# Patient Record
Sex: FEMALE | Race: WHITE
[De-identification: names, ages, dates, MRNs, and addresses within clinical notes are randomized per-mention and may not be internally consistent; named-entity substitution may affect disease eponyms.]

---

## 2019-12-09 ENCOUNTER — HOSPITAL ENCOUNTER (EMERGENCY)
Dept: HOSPITAL 88 - ER | Age: 64
Discharge: HOME | End: 2019-12-09
Payer: MEDICARE

## 2019-12-09 VITALS — BODY MASS INDEX: 24.99 KG/M2 | HEIGHT: 65 IN | WEIGHT: 150 LBS

## 2019-12-09 DIAGNOSIS — Z85.3: ICD-10-CM

## 2019-12-09 DIAGNOSIS — R53.1: Primary | ICD-10-CM

## 2019-12-09 DIAGNOSIS — Z85.830: ICD-10-CM

## 2019-12-09 DIAGNOSIS — F32.9: ICD-10-CM

## 2019-12-09 DIAGNOSIS — F50.00: ICD-10-CM

## 2019-12-09 LAB
ALBUMIN SERPL-MCNC: 2.9 G/DL (ref 3.5–5)
ALBUMIN/GLOB SERPL: 0.8 {RATIO} (ref 0.8–2)
ALP SERPL-CCNC: 491 IU/L (ref 40–150)
ALT SERPL-CCNC: 10 IU/L (ref 0–55)
ANION GAP SERPL CALC-SCNC: 12.5 MMOL/L (ref 8–16)
BACTERIA URNS QL MICRO: (no result) /HPF
BASOPHILS # BLD AUTO: 0 10*3/UL (ref 0–0.1)
BASOPHILS NFR BLD AUTO: 0.7 % (ref 0–1)
BILIRUB UR QL: NEGATIVE
BUN SERPL-MCNC: 7 MG/DL (ref 7–26)
BUN/CREAT SERPL: 11 (ref 6–25)
CALCIUM SERPL-MCNC: 7.8 MG/DL (ref 8.4–10.2)
CHLORIDE SERPL-SCNC: 100 MMOL/L (ref 98–107)
CLARITY UR: (no result)
CO2 SERPL-SCNC: 26 MMOL/L (ref 22–29)
COLOR UR: YELLOW
DEPRECATED NEUTROPHILS # BLD AUTO: 2.9 10*3/UL (ref 2.1–6.9)
DEPRECATED RBC URNS MANUAL-ACNC: (no result) /HPF (ref 0–5)
EGFRCR SERPLBLD CKD-EPI 2021: > 60 ML/MIN (ref 60–?)
EOSINOPHIL # BLD AUTO: 0.1 10*3/UL (ref 0–0.4)
EOSINOPHIL NFR BLD AUTO: 1.8 % (ref 0–6)
EPI CELLS URNS QL MICRO: (no result) /LPF
ERYTHROCYTE [DISTWIDTH] IN CORD BLOOD: 13.9 % (ref 11.7–14.4)
GLOBULIN PLAS-MCNC: 3.6 G/DL (ref 2.3–3.5)
GLUCOSE SERPLBLD-MCNC: 128 MG/DL (ref 74–118)
HCT VFR BLD AUTO: 37.7 % (ref 34.2–44.1)
HGB BLD-MCNC: 12 G/DL (ref 12–16)
KETONES UR QL STRIP.AUTO: NEGATIVE
LEUKOCYTE ESTERASE UR QL STRIP.AUTO: NEGATIVE
LYMPHOCYTES # BLD: 1.2 10*3/UL (ref 1–3.2)
LYMPHOCYTES NFR BLD AUTO: 25.6 % (ref 18–39.1)
MCH RBC QN AUTO: 29.8 PG (ref 28–32)
MCHC RBC AUTO-ENTMCNC: 31.8 G/DL (ref 31–35)
MCV RBC AUTO: 93.5 FL (ref 81–99)
MONOCYTES # BLD AUTO: 0.3 10*3/UL (ref 0.2–0.8)
MONOCYTES NFR BLD AUTO: 6 % (ref 4.4–11.3)
NEUTS SEG NFR BLD AUTO: 65.5 % (ref 38.7–80)
NITRITE UR QL STRIP.AUTO: NEGATIVE
NON-SQ EPI CELLS URNS QL MICRO: (no result)
PLATELET # BLD AUTO: 393 X10E3/UL (ref 140–360)
POTASSIUM SERPL-SCNC: 3.5 MMOL/L (ref 3.5–5.1)
PROT UR QL STRIP.AUTO: NEGATIVE
RBC # BLD AUTO: 4.03 X10E6/UL (ref 3.6–5.1)
SODIUM SERPL-SCNC: 135 MMOL/L (ref 136–145)
SP GR UR STRIP: 1.01 (ref 1.01–1.02)
UROBILINOGEN UR STRIP-MCNC: 0.2 MG/DL (ref 0.2–1)
WBC #/AREA URNS HPF: (no result) /HPF (ref 0–5)

## 2019-12-09 PROCEDURE — 81001 URINALYSIS AUTO W/SCOPE: CPT

## 2019-12-09 PROCEDURE — 36415 COLL VENOUS BLD VENIPUNCTURE: CPT

## 2019-12-09 PROCEDURE — 80053 COMPREHEN METABOLIC PANEL: CPT

## 2019-12-09 PROCEDURE — 85025 COMPLETE CBC W/AUTO DIFF WBC: CPT

## 2019-12-09 PROCEDURE — 99284 EMERGENCY DEPT VISIT MOD MDM: CPT

## 2020-04-04 ENCOUNTER — HOSPITAL ENCOUNTER (INPATIENT)
Dept: HOSPITAL 88 - ER | Age: 65
LOS: 13 days | Discharge: HOME HEALTH | DRG: 330 | End: 2020-04-17
Attending: INTERNAL MEDICINE | Admitting: INTERNAL MEDICINE
Payer: MEDICARE

## 2020-04-04 VITALS — BODY MASS INDEX: 26.33 KG/M2 | WEIGHT: 158 LBS | HEIGHT: 65 IN

## 2020-04-04 DIAGNOSIS — I16.0: ICD-10-CM

## 2020-04-04 DIAGNOSIS — C50.919: ICD-10-CM

## 2020-04-04 DIAGNOSIS — Z82.49: ICD-10-CM

## 2020-04-04 DIAGNOSIS — E44.0: ICD-10-CM

## 2020-04-04 DIAGNOSIS — C79.51: ICD-10-CM

## 2020-04-04 DIAGNOSIS — F17.210: ICD-10-CM

## 2020-04-04 DIAGNOSIS — G62.9: ICD-10-CM

## 2020-04-04 DIAGNOSIS — Z91.041: ICD-10-CM

## 2020-04-04 DIAGNOSIS — K21.9: ICD-10-CM

## 2020-04-04 DIAGNOSIS — Z90.10: ICD-10-CM

## 2020-04-04 DIAGNOSIS — Z79.899: ICD-10-CM

## 2020-04-04 DIAGNOSIS — Z88.5: ICD-10-CM

## 2020-04-04 DIAGNOSIS — Z90.49: ICD-10-CM

## 2020-04-04 DIAGNOSIS — D70.1: ICD-10-CM

## 2020-04-04 DIAGNOSIS — I48.0: ICD-10-CM

## 2020-04-04 DIAGNOSIS — F32.9: ICD-10-CM

## 2020-04-04 DIAGNOSIS — I16.1: ICD-10-CM

## 2020-04-04 DIAGNOSIS — Z83.3: ICD-10-CM

## 2020-04-04 DIAGNOSIS — I10: ICD-10-CM

## 2020-04-04 DIAGNOSIS — K56.52: Primary | ICD-10-CM

## 2020-04-04 DIAGNOSIS — T45.1X5A: ICD-10-CM

## 2020-04-04 DIAGNOSIS — Z90.12: ICD-10-CM

## 2020-04-04 DIAGNOSIS — F41.9: ICD-10-CM

## 2020-04-04 DIAGNOSIS — C78.7: ICD-10-CM

## 2020-04-04 LAB
ALBUMIN SERPL-MCNC: 4.1 G/DL (ref 3.5–5)
ALBUMIN/GLOB SERPL: 1.2 {RATIO} (ref 0.8–2)
ALP SERPL-CCNC: 245 IU/L (ref 40–150)
ALT SERPL-CCNC: 10 IU/L (ref 0–55)
ANION GAP SERPL CALC-SCNC: 16.9 MMOL/L (ref 8–16)
BACTERIA URNS QL MICRO: (no result) /HPF
BASOPHILS # BLD AUTO: 0 10*3/UL (ref 0–0.1)
BASOPHILS NFR BLD AUTO: 0.4 % (ref 0–1)
BILIRUB UR QL: (no result)
BUN SERPL-MCNC: 10 MG/DL (ref 7–26)
BUN/CREAT SERPL: 14 (ref 6–25)
CALCIUM SERPL-MCNC: 9 MG/DL (ref 8.4–10.2)
CHLORIDE SERPL-SCNC: 97 MMOL/L (ref 98–107)
CK MB SERPL-MCNC: 1.8 NG/ML (ref 0–5)
CK SERPL-CCNC: 89 IU/L (ref 29–168)
CLARITY UR: CLEAR
CO2 SERPL-SCNC: 29 MMOL/L (ref 22–29)
COLOR UR: YELLOW
DEPRECATED APTT PLAS QN: 28.5 SECONDS (ref 23.8–35.5)
DEPRECATED INR PLAS: 1.01
DEPRECATED NEUTROPHILS # BLD AUTO: 2.2 10*3/UL (ref 2.1–6.9)
DEPRECATED RBC URNS MANUAL-ACNC: (no result) /HPF (ref 0–5)
EGFRCR SERPLBLD CKD-EPI 2021: > 60 ML/MIN (ref 60–?)
EOSINOPHIL # BLD AUTO: 0 10*3/UL (ref 0–0.4)
EOSINOPHIL NFR BLD AUTO: 0 % (ref 0–6)
EPI CELLS URNS QL MICRO: (no result) /LPF
ERYTHROCYTE [DISTWIDTH] IN CORD BLOOD: 15.9 % (ref 11.7–14.4)
GLOBULIN PLAS-MCNC: 3.5 G/DL (ref 2.3–3.5)
GLUCOSE SERPLBLD-MCNC: 133 MG/DL (ref 74–118)
HCT VFR BLD AUTO: 28.3 % (ref 34.2–44.1)
HGB BLD-MCNC: 9.3 G/DL (ref 12–16)
KETONES UR QL STRIP.AUTO: (no result)
LEUKOCYTE ESTERASE UR QL STRIP.AUTO: NEGATIVE
LIPASE SERPL-CCNC: < 4 U/L (ref 8–78)
LYMPHOCYTES # BLD: 0.3 10*3/UL (ref 1–3.2)
LYMPHOCYTES NFR BLD AUTO: 9.5 % (ref 18–39.1)
MCH RBC QN AUTO: 31.2 PG (ref 28–32)
MCHC RBC AUTO-ENTMCNC: 32.9 G/DL (ref 31–35)
MCV RBC AUTO: 95 FL (ref 81–99)
MONOCYTES # BLD AUTO: 0.2 10*3/UL (ref 0.2–0.8)
MONOCYTES NFR BLD AUTO: 8 % (ref 4.4–11.3)
NEUTS SEG NFR BLD AUTO: 82.1 % (ref 38.7–80)
NITRITE UR QL STRIP.AUTO: NEGATIVE
PLATELET # BLD AUTO: 222 X10E3/UL (ref 140–360)
POTASSIUM SERPL-SCNC: 3.9 MMOL/L (ref 3.5–5.1)
PROT UR QL STRIP.AUTO: >=300
PROTHROMBIN TIME: 13.9 SECONDS (ref 11.9–14.5)
RBC # BLD AUTO: 2.98 X10E6/UL (ref 3.6–5.1)
SODIUM SERPL-SCNC: 139 MMOL/L (ref 136–145)
SP GR UR STRIP: 1.02 (ref 1.01–1.02)
UROBILINOGEN UR STRIP-MCNC: 0.2 MG/DL (ref 0.2–1)
WBC #/AREA URNS HPF: (no result) /HPF (ref 0–5)

## 2020-04-04 PROCEDURE — 83690 ASSAY OF LIPASE: CPT

## 2020-04-04 PROCEDURE — 96365 THER/PROPH/DIAG IV INF INIT: CPT

## 2020-04-04 PROCEDURE — 88307 TISSUE EXAM BY PATHOLOGIST: CPT

## 2020-04-04 PROCEDURE — 71045 X-RAY EXAM CHEST 1 VIEW: CPT

## 2020-04-04 PROCEDURE — 83735 ASSAY OF MAGNESIUM: CPT

## 2020-04-04 PROCEDURE — 74176 CT ABD & PELVIS W/O CONTRAST: CPT

## 2020-04-04 PROCEDURE — 82550 ASSAY OF CK (CPK): CPT

## 2020-04-04 PROCEDURE — 74019 RADEX ABDOMEN 2 VIEWS: CPT

## 2020-04-04 PROCEDURE — 81001 URINALYSIS AUTO W/SCOPE: CPT

## 2020-04-04 PROCEDURE — 80053 COMPREHEN METABOLIC PANEL: CPT

## 2020-04-04 PROCEDURE — 99284 EMERGENCY DEPT VISIT MOD MDM: CPT

## 2020-04-04 PROCEDURE — 93306 TTE W/DOPPLER COMPLETE: CPT

## 2020-04-04 PROCEDURE — 80048 BASIC METABOLIC PNL TOTAL CA: CPT

## 2020-04-04 PROCEDURE — 36415 COLL VENOUS BLD VENIPUNCTURE: CPT

## 2020-04-04 PROCEDURE — 84484 ASSAY OF TROPONIN QUANT: CPT

## 2020-04-04 PROCEDURE — 85610 PROTHROMBIN TIME: CPT

## 2020-04-04 PROCEDURE — 96366 THER/PROPH/DIAG IV INF ADDON: CPT

## 2020-04-04 PROCEDURE — 97139 UNLISTED THERAPEUTIC PX: CPT

## 2020-04-04 PROCEDURE — 85730 THROMBOPLASTIN TIME PARTIAL: CPT

## 2020-04-04 PROCEDURE — 85025 COMPLETE CBC W/AUTO DIFF WBC: CPT

## 2020-04-04 PROCEDURE — 93005 ELECTROCARDIOGRAM TRACING: CPT

## 2020-04-04 PROCEDURE — 82553 CREATINE MB FRACTION: CPT

## 2020-04-04 PROCEDURE — 36569 INSJ PICC 5 YR+ W/O IMAGING: CPT

## 2020-04-04 PROCEDURE — 82948 REAGENT STRIP/BLOOD GLUCOSE: CPT

## 2020-04-04 PROCEDURE — 0D9670Z DRAINAGE OF STOMACH WITH DRAINAGE DEVICE, VIA NATURAL OR ARTIFICIAL OPENING: ICD-10-PCS | Performed by: INTERNAL MEDICINE

## 2020-04-04 PROCEDURE — 96361 HYDRATE IV INFUSION ADD-ON: CPT

## 2020-04-04 NOTE — DIAGNOSTIC IMAGING REPORT
EXAMINATION:  CHEST SINGLE (PORTABLE)   



COMPARISON:  None



INDICATION: Epigastric pain 

^ERMD ORDER

^[id number]

^1914

^Y  



DISCUSSION:

Frontal view of the chest obtained at 1914 hours.



HEART AND MEDIASTINUM:  The cardiomediastinal silhouette is normal in

morphology. 

  

LINES:  None.



LUNGS:  Diffuse hyperinflation. No pneumonia or pulmonary edema.



PLEURA:  No pleural effusion or pneumothorax.



BONES AND SOFT TISSUES:  No focal osseous lesion. There are surgical clips in

the left axilla.





IMPRESSION: 

Pulmonary hyperinflation suggestive of small airways disease. No acute

cardiopulmonary process.



Signed by: Dr. Juan Cannon MD on 4/4/2020 8:06 PM

## 2020-04-04 NOTE — DIAGNOSTIC IMAGING REPORT
CT Abdomen and Pelvis without contrast



INDICATION:  Epigastric pain, 

^ABD PAIN

^[id number]

^2150



TECHNIQUE: Thin collimation axial images obtained from the diaphragm to the

level of the pubic symphysis without nonionic intravenous contrast. Enteric

contrast was administered.



Dose reduction techniques used: Automated exposure control, adjustment of the

mAs and/or kVp according to patient size, standardized low-dose protocol,

and/or iterative reconstruction technique.



RADIATION DOSE:

     Total DLP: 245.45 mGy*cm

     Estimated effective dose: (DLP x 0.015 x size factor) mSv

     CTDIvol has been reviewed. It is below the limits set by the Radiation

Protocol Committee (RPC).



COMPARISON: None.

 

ABDOMEN FINDINGS:



Lung Bases: Bibasilar atelectasis/scarring, particularly in the left lower

lobe. Small pericardial effusion.



Liver: Multiple low attenuating hepatic lesions appear solid. The largest

measures approximately 2.7 cm and is located in the right lobe.



Gallbladder: Present and appears normal.  No ductal dilatation.



Pancreas: Normal attenuation without mass.



Spleen: Normal size without mass.



Adrenal Glands: No evidence for mass.



Kidneys: 

Right: No renal calculus.  No cortical mass or hydronephrosis



Left:  No renal calculus.  No cortical mass or hydronephrosis



Lymph Nodes: No enlarged abdominal or mesenteric lymph nodes. No enlarged

pelvic or lymph nodes.



Aorta:  Normal in diameter with calcifications throughout. There are calcific

age is throughout the visceral arteries.



PELVIS FINDINGS: 



Bowel: 

Stomach:  Contains enteric contrast and water. No mural thickening.



Small Bowel: No enteric contrast. There are chain sutures in a small bowel loop

in the inferior abdomen. Multiple dilated small bowel loops measure up to 6.1

cm. A clear transition point is not visible. However, small bowel loops in the

right hemiabdomen leading to the large bowel are collapsed with the presence of

chain sutures.



Large Bowel: Collapsed. Postoperative changes suggestive of right

hemicolectomy.



Bladder: Well distended and normal.



Ureters: No ureteral dilatation or calculus.



The uterus is absent. No adnexal mass.



Peritoneum/retroperitoneum: Small amount of pelvic free fluid. No free air.



Bones: Diffuse lytic and blastic metastases. Pedicle screws and vertical

stabilizing bars in L3 and L5. There is methyl methacrylate surrounding the

pedicle screws in L3 and L5. Postoperative changes of L4 from laminectomy. 



IMPRESSION:



1.  High-grade small bowel obstruction without perforation. No abdominal mass

is identified on this unenhanced examination as an etiology.



2. Postoperative changes of the small bowel and large bowel. Correlation with

surgical history is needed.



3. Diffuse intrahepatic and osseous metastases. The organ of origin cannot be

determined on this examination.



Signed by: Dr. Juan Cannon MD on 4/4/2020 10:45 PM

## 2020-04-04 NOTE — XMS REPORT
Patient Summary Document

                             Created on: 2020



MAURO COLBY

External Reference #: [id number]

: 1955

Sex: Female



Demographics







                          Address                   [address] [place] [address]

 

                          Home Phone                [phone]

 

                          Preferred Language        Unknown

 

                          Marital Status            Unknown

 

                          Adventism Affiliation     Unknown

 

                          Race                      Unknown

 

                                        Additional Race(s)  

 

                          Ethnic Group              Unknown





Author







                          Author                    [facility]

 

                          [facility]

 

                          Address                   Unknown

 

                          Phone                     Unavailable







Care Team Providers







                    Care Team Member Name    Role                Phone

 

                    BRYN TRAORE    Unavailable         Unavailable







Problems

This patient has no known problems.



Allergies, Adverse Reactions, Alerts

This patient has no known allergies or adverse reactions.



Medications

This patient has no known medications.



Results







           Test Description    Test Time    Test Comments    Text Results    Atomic Results    Result

 Comments

 

                CT ABDOMEN/PELVIS WO    2020 22:38:00                                                      

                                                    [place] [address]      Patient Name: MAURO COLBY                                   MR
#: [id number]                     : 1955                               
   Age/Sex: 64/F  Acct #: [id number]                              Req #: [id number]
[id number]  Adm Physician:                                                      
Ordered by: HALIMA KENNEDY NP                            Report #: [id number]  
     Location: ER                                      Room/Bed:                
    
___________________________________________________________________________________________________
   Procedure: [id number] CT/CT ABDOMEN/PELVIS WO  Exam Date: 20            
               Exam Time:                                               
REPORT STATUS: Signed    CT Abdomen and Pelvis without contrast      INDICATION:
 Epigastric pain,     ABD PAIN    2020      TECHNIQUE: Thin 
collimation axial images obtained from the diaphragm to the   level of the pubic
symphysis without nonionic intravenous contrast. Enteric   contrast was 
administered.      Dose reduction techniques used: Automated exposure control, 
adjustment of the   mAs and/or kVp according to patient size, standardized low-
dose protocol,   and/or iterative reconstruction technique.      RADIATION DOSE:
       Total DLP: 245.45 mGy*cm        Estimated effective dose: (DLP x 0.015 x 
size factor) mSv        CTDIvol has been reviewed. It is below the limits set by
the Radiation   Protocol Committee (RPC).      COMPARISON: None.       ABDOMEN 
FINDINGS:      Lung Bases: Bibasilar atelectasis/scarring, particularly in the 
left lower   lobe. Small pericardial effusion.      Liver: Multiple low 
attenuating hepatic lesions appear solid. The largest   measures approximately 
2.7 cm and is located in the right lobe.      Gallbladder: Present and appears 
normal.  No ductal dilatation.      Pancreas: Normal attenuation without mass.  
   Spleen: Normal size without mass.      Adrenal Glands: No evidence for mass. 
    Kidneys:    Right: No renal calculus.  No cortical mass or hydronephrosis   
  Left:  No renal calculus.  No cortical mass or hydronephrosis      Lymph 
Nodes: No enlarged abdominal or mesenteric lymph nodes. No enlarged   pelvic or 
lymph nodes.      Aorta:  Normal in diameter with calcifications throughout. 
There are calcific   age is throughout the visceral arteries.      PELVIS 
FINDINGS:       Bowel:    Stomach:  Contains enteric contrast and water. No 
mural thickening.      Small Bowel: No enteric contrast. There are chain sutures
in a small bowel loop   in the inferior abdomen. Multiple dilated small bowel 
loops measure up to 6.1   cm. A clear transition point is not visible. However, 
small bowel loops in the   right hemiabdomen leading to the large bowel are 
collapsed with the presence of   chain sutures.      Large Bowel: Collapsed. 
Postoperative changes suggestive of right   hemicolectomy.      Bladder: Well 
distended and normal.      Ureters: No ureteral dilatation or calculus.      The
uterus is absent. No adnexal mass.      Peritoneum/retroperitoneum: Small amount
of pelvic free fluid. No free air.      Bones: Diffuse lytic and blastic 
metastases. Pedicle screws and vertical   stabilizing bars in L3 and L5. There 
is methyl methacrylate surrounding the   pedicle screws in L3 and L5. 
Postoperative changes of L4 from laminectomy.       IMPRESSION:      1.  High-
grade small bowel obstruction without perforation. No abdominal mass   is 
identified on this unenhanced examination as an etiology.      2. Postoperative 
changes of the small bowel and large bowel. Correlation with   surgical history 
is needed.      3. Diffuse intrahepatic and osseous metastases. The organ of 
origin cannot be   determined on this examination.      Signed by: Dr. Awilda Cannon MD on 2020 10:45 PM        Dictated By: AWILDA CANNON MD  
Electronically Signed By: AWILDA CANNON MD on 20  Transcribed By:
JAIMIE on 20       COPY TO:   HALIMA KENNEDY NP              

 

                CHEST SINGLE (PORTABLE)    2020 20:04:00                                                   

                                                       [place] [address]      Patient Name: MAURO COLBY                           
       MR #: [id number]                     : 1955                     
             Age/Sex: 64/F  Acct #: [id number]                              
Req #: [id number]  Adm Physician:                                               
      Ordered by: HALIMA KENNEDY NP                            Report #: [id number]
[id number]        Location: ER                                      Room/Bed:         
           
___________________________________________________________________________________________________
   Procedure: [id number] DX/CHEST SINGLE (PORTABLE)  Exam Date: 20         
                  Exam Time:                                               
REPORT STATUS: Signed    EXAMINATION:  CHEST SINGLE (PORTABLE)         COMPARI
SON:  None      INDICATION: Epigastric pain     ERMD ORDER    [id number]    1914        DISCUSSION:   Frontal view of the chest obtained at 1914 hours.      
HEART AND MEDIASTINUM:  The cardiomediastinal silhouette is normal in   
morphology.         LINES:  None.      LUNGS:  Diffuse hyperinflation. No 
pneumonia or pulmonary edema.      PLEURA:  No pleural effusion or pneumothorax.
     BONES AND SOFT TISSUES:  No focal osseous lesion. There are surgical clips 
in   the left axilla.         IMPRESSION:    Pulmonary hyperinflation suggestive
of small airways disease. No acute   cardiopulmonary process.      Signed by: 
Dr. Awilda Cannon MD on 2020 8:06 PM        Dictated By: AWILDA CANNON MD  Electronically Signed By: AWILDA CANNON MD on 20  
Transcribed By: JAIMIE on 20       COPY TO:   HALIMA KENNEDY NP

## 2020-04-05 VITALS — DIASTOLIC BLOOD PRESSURE: 71 MMHG | SYSTOLIC BLOOD PRESSURE: 135 MMHG

## 2020-04-05 VITALS — SYSTOLIC BLOOD PRESSURE: 139 MMHG | DIASTOLIC BLOOD PRESSURE: 89 MMHG

## 2020-04-05 VITALS — DIASTOLIC BLOOD PRESSURE: 75 MMHG | SYSTOLIC BLOOD PRESSURE: 135 MMHG

## 2020-04-05 VITALS — SYSTOLIC BLOOD PRESSURE: 148 MMHG | DIASTOLIC BLOOD PRESSURE: 72 MMHG

## 2020-04-05 VITALS — SYSTOLIC BLOOD PRESSURE: 135 MMHG | DIASTOLIC BLOOD PRESSURE: 75 MMHG

## 2020-04-05 VITALS — DIASTOLIC BLOOD PRESSURE: 79 MMHG | SYSTOLIC BLOOD PRESSURE: 161 MMHG

## 2020-04-05 LAB
ALBUMIN SERPL-MCNC: 3.5 G/DL (ref 3.5–5)
ALBUMIN/GLOB SERPL: 1.2 {RATIO} (ref 0.8–2)
ALP SERPL-CCNC: 196 IU/L (ref 40–150)
ALT SERPL-CCNC: 9 IU/L (ref 0–55)
ANION GAP SERPL CALC-SCNC: 13.7 MMOL/L (ref 8–16)
BASOPHILS # BLD AUTO: 0 10*3/UL (ref 0–0.1)
BASOPHILS NFR BLD AUTO: 0.3 % (ref 0–1)
BUN SERPL-MCNC: 10 MG/DL (ref 7–26)
BUN/CREAT SERPL: 14 (ref 6–25)
CALCIUM SERPL-MCNC: 7.7 MG/DL (ref 8.4–10.2)
CHLORIDE SERPL-SCNC: 104 MMOL/L (ref 98–107)
CO2 SERPL-SCNC: 27 MMOL/L (ref 22–29)
DEPRECATED NEUTROPHILS # BLD AUTO: 2 10*3/UL (ref 2.1–6.9)
EGFRCR SERPLBLD CKD-EPI 2021: > 60 ML/MIN (ref 60–?)
EOSINOPHIL # BLD AUTO: 0 10*3/UL (ref 0–0.4)
EOSINOPHIL NFR BLD AUTO: 0.3 % (ref 0–6)
ERYTHROCYTE [DISTWIDTH] IN CORD BLOOD: 16.4 % (ref 11.7–14.4)
GLOBULIN PLAS-MCNC: 3 G/DL (ref 2.3–3.5)
GLUCOSE SERPLBLD-MCNC: 106 MG/DL (ref 74–118)
HCT VFR BLD AUTO: 36.7 % (ref 34.2–44.1)
HGB BLD-MCNC: 11.9 G/DL (ref 12–16)
LYMPHOCYTES # BLD: 0.5 10*3/UL (ref 1–3.2)
LYMPHOCYTES NFR BLD AUTO: 17.6 % (ref 18–39.1)
MCH RBC QN AUTO: 31.2 PG (ref 28–32)
MCHC RBC AUTO-ENTMCNC: 32.4 G/DL (ref 31–35)
MCV RBC AUTO: 96.1 FL (ref 81–99)
MONOCYTES # BLD AUTO: 0.5 10*3/UL (ref 0.2–0.8)
MONOCYTES NFR BLD AUTO: 15.6 % (ref 4.4–11.3)
NEUTS SEG NFR BLD AUTO: 65.9 % (ref 38.7–80)
PLATELET # BLD AUTO: 295 X10E3/UL (ref 140–360)
POTASSIUM SERPL-SCNC: 3.7 MMOL/L (ref 3.5–5.1)
RBC # BLD AUTO: 3.82 X10E6/UL (ref 3.6–5.1)
SODIUM SERPL-SCNC: 141 MMOL/L (ref 136–145)

## 2020-04-05 RX ADMIN — SODIUM CHLORIDE PRN MG: 900 INJECTION INTRAVENOUS at 02:30

## 2020-04-05 RX ADMIN — SODIUM CHLORIDE PRN MG: 900 INJECTION INTRAVENOUS at 06:22

## 2020-04-05 RX ADMIN — DEXTROSE, SODIUM CHLORIDE, AND POTASSIUM CHLORIDE SCH MLS/HR: 5; .9; .15 INJECTION INTRAVENOUS at 17:44

## 2020-04-05 RX ADMIN — SODIUM CHLORIDE PRN MG: 900 INJECTION INTRAVENOUS at 11:10

## 2020-04-05 RX ADMIN — SODIUM CHLORIDE SCH ML: 900 IRRIGANT IRRIGATION at 21:31

## 2020-04-05 RX ADMIN — Medication PRN MG: at 06:21

## 2020-04-05 RX ADMIN — TAZOBACTAM SODIUM AND PIPERACILLIN SODIUM SCH GM: 375; 3 INJECTION, SOLUTION INTRAVENOUS at 14:32

## 2020-04-05 RX ADMIN — Medication PRN MG: at 11:10

## 2020-04-05 RX ADMIN — TAZOBACTAM SODIUM AND PIPERACILLIN SODIUM SCH GM: 375; 3 INJECTION, SOLUTION INTRAVENOUS at 00:41

## 2020-04-05 RX ADMIN — SODIUM CHLORIDE SCH MLS/HR: 9 INJECTION, SOLUTION INTRAVENOUS at 00:41

## 2020-04-05 RX ADMIN — TAZOBACTAM SODIUM AND PIPERACILLIN SODIUM SCH GM: 375; 3 INJECTION, SOLUTION INTRAVENOUS at 06:05

## 2020-04-05 RX ADMIN — SODIUM CHLORIDE SCH ML: 900 IRRIGANT IRRIGATION at 14:32

## 2020-04-05 RX ADMIN — Medication PRN MG: at 22:08

## 2020-04-05 RX ADMIN — SODIUM CHLORIDE PRN MG: 900 INJECTION INTRAVENOUS at 16:25

## 2020-04-05 RX ADMIN — Medication PRN MG: at 02:30

## 2020-04-05 RX ADMIN — Medication PRN MG: at 16:25

## 2020-04-05 RX ADMIN — SODIUM CHLORIDE SCH ML: 900 IRRIGANT IRRIGATION at 18:15

## 2020-04-05 RX ADMIN — ENOXAPARIN SODIUM SCH MG: 40 INJECTION SUBCUTANEOUS at 17:09

## 2020-04-05 RX ADMIN — SODIUM CHLORIDE PRN MG: 900 INJECTION INTRAVENOUS at 22:08

## 2020-04-05 RX ADMIN — TAZOBACTAM SODIUM AND PIPERACILLIN SODIUM SCH GM: 375; 3 INJECTION, SOLUTION INTRAVENOUS at 22:15

## 2020-04-05 RX ADMIN — SODIUM CHLORIDE SCH MLS/HR: 9 INJECTION, SOLUTION INTRAVENOUS at 09:09

## 2020-04-05 NOTE — HISTORY AND PHYSICAL
CHIEF COMPLAINT:  Abdominal pain, nausea, vomiting.

 

HISTORY OF PRESENT ILLNESS:  This is a 64-year-old pleasant female, known history of

breast cancer diagnosed in 2015, now has currently metastasis to the bone from the

breast cancer, who comes into the ED with complaints of abdominal pain, nausea, vomiting

that began on Friday, on 04/03/2020.  The patient reports that she woke up suddenly, had

some nausea and vomiting, and had some abdominal pain, but never resolved Friday

evening.  Saturday all day, she had some nausea, vomiting.  Denies any hematemesis.

Denies any diarrhea.  Reports abdominal pain diffusely.  The patient was in excruciating

pain, came into the ED for further evaluation and management.  While here, imaging

studies of her abdomen and pelvis show a high-grade small bowel obstruction.  The

patient was admitted for further evaluation and management, and General Surgery was

consulted.  The patient currently has an NG tube to intermittent wall suction, had some

bile-greenish material suctioned.  She is currently stable with no complaints at this

time. 

 

REVIEW OF SYSTEMS:

Pertinent positives, abdominal pain, nausea, vomiting, decreased oral intake. 

The rest of 14-point review of systems have been reviewed with the patient and are

negative. 

 

ALLERGIES:  ACETAMINOPHEN, CODEINE, HYDROCODONE, AND IODINE.

 

HOME MEDICATIONS:  Aspirin, Wellbutrin, digoxin, gabapentin, loratadine, melatonin,

metoprolol, mirtazapine, Protonix, tizanidine, valsartan, Ibrance, and cholecalciferol. 

 

PAST MEDICAL HISTORY:  Breast cancer with metastasis to the bone, hypertension, acid

reflux, neuropathy, recent diagnosis of atrial fibrillation. 

 

PAST SURGICAL HISTORY:  None.

 

FAMILY HISTORY:  Hypertension, diabetes.

 

SOCIAL HISTORY:  No drugs.  No alcohol.  Does not smoke.  Good social support.

 

LABORATORY DATA:  Shows white count was 3, hemoglobin 11.9, hematocrit 36.7, platelets

of 295.  Coagulation, PT 13, INR 1, PTT 28.  Chemistry, sodium 141, potassium 3.7,

chloride 104,bicarb 27, anion gap of 13, BUN is 10, creatinine is 0.7, glucose is 106,

calcium 7.7.  LFTs within normal range.  Alkaline phosphatase 196.  Troponins were

negative.  Total protein 6.5.  Lipase was 4.  Albumin was 3.5.  Urinalysis seems to be

negative. 

 

MICROBIOLOGY:  None.

 

IMAGING STUDIES:  Chest x-ray, pulmonary hyperinflation suggestive of small airway

disease.  No acute cardiopulmonary process.  CT abdomen and pelvis shows high-grade

small bowel obstruction without perforation.  No abdominal mass is identified on this

unenhanced examination as an etiology.  Postoperative changes of small bowel and large

bowel.  Diffuse intrahepatic and osseous metastasis.  The organ of origin cannot be

determined on this examination. 

 

PHYSICAL EXAMINATION:

VITAL SIGNS:  Temperature is 97.3, pulse 89, respiratory rate 16, blood pressure 140/72,

pulse ox is 91% on room air. 

GENERAL:  Not in acute distress.  Alert, oriented x3.  Cooperative on examination. 

HEENT:  Head normocephalic, atraumatic.  Eyes; pupils are reactive to light bilaterally.

 Extraocular motions intact bilaterally. 

NECK:  Supple.  Good range of motion.  Throat, no evidence of erythema or exudates in

the posterior pharynx.  Has poor dentition. 

PULMONARY:  Clear to auscultation bilaterally.  No wheezing, rales, or rhonchi.  No

crackles appreciated. 

CARDIOVASCULAR:  Positive S1 and S2.  No murmurs, rubs, or gallops appreciated. 

ABDOMEN: She has decreased breath sounds.  She has an NG tube in place.  She is

nontender.  She is distended, but much improved and very soft. 

MUSCULOSKELETAL:  Strength is 5/5 throughout.  No evidence of muscle deficits on

examination.  No weakness appreciated. 

NEUROLOGIC:  Cranial nerves 2 through 12 grossly intact.  No evidence of any

neurological deficits on exam. 

SKIN:  Intact.  Warm to touch.  Good cap refill. 

PSYCHIATRIC:  Normal affect and mood. 

EXTREMITIES:  No edema.  Good range of motion throughout.,

IMPRESSION:  

1. High-grade small-bowel obstruction.

2. Breast cancer with metastasis.

3. Abdominal pain, nausea, vomiting secondary to high-grade small-bowel obstruction.

4. Recent diagnosis of atrial fibrillation.

5. Hypertension.

 

PLAN:  In terms of her high-grade small-bowel obstruction, GI has been consulted.  We

will continue with an NG tube for now to intermittent wall suctioning.  If she does not

resolve, she will likely need some form of surgery.  She will get serial imaging

studies.  If she does not resolve by tomorrow, I will go ahead and start her on IV PPN

for nutrition.  Minimize pain control.  General Surgery is following.  In terms of her

new onset of atrial fibrillation diagnosed about several days ago, despite here her

cardiac telemetry shows normal sinus rhythm, I did go ahead and consult with Cardiology

here.  I will put her on IV p.r.n. metoprolol for elevated heart rate.  She is not on

any anticoagulation.  As for her breast cancer with metastasis, I did consult with an

oncologist who will know the oncologist in [place].  He does not work in the same group,

but he is aware of this Oncology group.  Dr. Fenton has been consulted for further

management and care and evaluation.  Her home medications will be restarted based on if

there is any IV regimen and if needed.  She is currently n.p.o. as per General Surgery's

recommendations.  Nutrition, n.p.o., ice chips only, and IV fluids.  Lovenox for DVT

prophylaxis for now. 

 

CONSULTANTS INVOLVED:  Cardiology, General Surgery, Hematology/Oncology.

 

 

 

 

______________________________

MD SELWYN Valentine/MODL

D:  04/05/2020 15:23:30

T:  04/05/2020 17:02:27

Job #:  [id number]

## 2020-04-05 NOTE — NUR
PATIENT ASSISTED TO THE RESTROOM AND BACK TO BED. NG TUBE SUCTIONING GREENISH FLUID. BED IN 
LOWER POSITION, CALL LIGHT AT REACH.

## 2020-04-05 NOTE — NUR
RECEIVED PATIENT FROM ER IN STABLE CONDITION, NO SIGNS OF DISTRESS NOTED. NG TUBE IS INTACT 
AND SUCTION IS TURNED ON, PATIENT VOICED PAIN AT A LEVEL OF 3 AND WAS PREVIOUSLY MEDICATED 
AS ORDERED. IV FLUIDS ARE RUNNING AT ORDERED RATE. BED IS IN LOWEST POSITION, BOTH SIDE 
RAILS ARE UP, CALL LIGHT IS WITHIN EASY REACH, WILL CONTINUE TO MONITOR.

## 2020-04-05 NOTE — CONSULTATION
DATE OF CONSULTATION:    

 

REASON FOR CONSULTATION:  Small bowel obstruction.

 

HISTORY OF PRESENT ILLNESS:  The patient is a very pleasant 64-year-old female 
who is

admitted via the emergency room complaining of crampy mid abdominal pain and 
vomiting

since Friday.  She presented to the emergency room and had a CT scan of the 
abdomen

without IV contrast because she is allergic to iodine, which revealed a small 
bowel

obstruction without perforation with multiple dilated small bowel loops without 
a clear

transition zone.  The small bowel loops in the right sarath-abdomen proximal to 
the large

bowel were collapsed.  There is evidence of surgical staples. The patient's 
liver shows multiple 

liver metastasisand there are multiplebone metastasis.An NGT was inserted in the
emergency room.

 

She had left modified radical mastectomy in 2015. She was diagnosed with

generalized bony mets in 2019 and underwent lumbar spine stabilization with jamie

implantation because of the metastasis causing nerve impingement.  Subsequent to
that,

she underwent radiation therapy.  She also has been started on Ibrance.  She has
become

neutropenic since December 2019 following the initiation of the Ibrance.  She is

currently seen and followed up elsewhere not at [facility] or in the [facility].  
Past

surgical history in addition to the modified left radical mastectomy significant
for

laparoscopic appendectomy for appendicitis.  Apparently, it had a complicated 
course

because she states that she was hospitalized for 11 days.  Subsequent to that 3

months later in 2019, she also has to undergo a second procedure because 
according to

her history, which is not clear to me, she had a hernia in the left lower 
quadrant and

had to undergo small bowel resection.  Apparently all of this was done 
laparoscopically.

She denies any mesh placement at that time. 

 

PAST MEDICAL HISTORY:  Significant for atrial fibrillation.

 

CURRENT MEDICATIONS:  According her only Ibrance.

 

ALLERGIES:  SHE HAS ALLERGIES TO IODINE AND CODEINE.

 

SOCIAL HISTORY:  She smokes 3 to 4 cigarettes per day.

 

PHYSICAL EXAMINATION:

GENERAL:  Reveals a 64-year-old female, awake, alert, in no acute distress. 

HEAD, EYES, EARS, NOSE, AND THROAT:  Atraumatic. 

LUNGS:  Clear. 

HEART:  Reveals a slow, irregular rhythm. 

ABDOMEN:  Soft.  There is no major distention.There are no peritoneal signs. NGT
in place

EXTREMITIES:  Reveals no clubbing, cyanosis, or edema.

LABORATORY DATA:  Admission labs revealed a neutropenia with a white count of 
2.6,

hematocrit of 28, and platelet count of 222.  Liver chemistries and electrolytes
are

normal.  Calcium is 9.0.  Alkaline phosphatase is 245.  The rest of the liver 
enzymes

are normal. 

RADIOLOGICAL DATA:CT as previously described.CXR no mets or acute process.

ASSESSMENT:  

1. High-grade small-bowel obstruction.  Differential diagnosis is adhesions from

previous abdominal surgeries versus small bowel obstruction from breast  cancer.


2. Large numbers of liver mets.

3. She has generalized bony metastases.

4. Neutropenia.

5. Atrial fibrillation.

 

PLAN:  The plan is to proceed with NG tube decompression of the stomach.  Do 
serial

x-rays and labs.  We will be following her along with you andother consultants.

Hopefully, she will not need any laparotomy. At this point, I think given her

complicated oncological history,  oncological evaluation should be obtained. 

 

Thank you very much for this consult.

 

 

 

 

______________________________

MD MIKE Segovia/JESSIE

D:  04/05/2020 13:25:14

T:  04/05/2020 17:10:58

Job #:  [id number]

 

MTDD

## 2020-04-05 NOTE — NUR
PATIENT IN BED WITH HEAD OF BED ELEVATED TALKING ON THE PHONE, NO DISTRESS NOTED. NG TUBE IN 
PLACE TO LOW SUCTIONING. CALL LIGHT AT REACH.

## 2020-04-06 VITALS — SYSTOLIC BLOOD PRESSURE: 130 MMHG | DIASTOLIC BLOOD PRESSURE: 72 MMHG

## 2020-04-06 VITALS — SYSTOLIC BLOOD PRESSURE: 151 MMHG | DIASTOLIC BLOOD PRESSURE: 86 MMHG

## 2020-04-06 VITALS — DIASTOLIC BLOOD PRESSURE: 76 MMHG | SYSTOLIC BLOOD PRESSURE: 127 MMHG

## 2020-04-06 VITALS — DIASTOLIC BLOOD PRESSURE: 74 MMHG | SYSTOLIC BLOOD PRESSURE: 149 MMHG

## 2020-04-06 VITALS — DIASTOLIC BLOOD PRESSURE: 68 MMHG | SYSTOLIC BLOOD PRESSURE: 126 MMHG

## 2020-04-06 VITALS — DIASTOLIC BLOOD PRESSURE: 77 MMHG | SYSTOLIC BLOOD PRESSURE: 140 MMHG

## 2020-04-06 LAB
ANION GAP SERPL CALC-SCNC: 14.3 MMOL/L (ref 8–16)
BASOPHILS # BLD AUTO: 0 10*3/UL (ref 0–0.1)
BASOPHILS NFR BLD AUTO: 1.2 % (ref 0–1)
BUN SERPL-MCNC: 9 MG/DL (ref 7–26)
BUN/CREAT SERPL: 13 (ref 6–25)
CALCIUM SERPL-MCNC: 7.7 MG/DL (ref 8.4–10.2)
CHLORIDE SERPL-SCNC: 107 MMOL/L (ref 98–107)
CO2 SERPL-SCNC: 26 MMOL/L (ref 22–29)
DEPRECATED NEUTROPHILS # BLD AUTO: 1 10*3/UL (ref 2.1–6.9)
EGFRCR SERPLBLD CKD-EPI 2021: > 60 ML/MIN (ref 60–?)
EOSINOPHIL # BLD AUTO: 0 10*3/UL (ref 0–0.4)
EOSINOPHIL NFR BLD AUTO: 1.2 % (ref 0–6)
EOSINOPHIL NFR BLD MANUAL: 1 % (ref 0–7)
ERYTHROCYTE [DISTWIDTH] IN CORD BLOOD: 16.5 % (ref 11.7–14.4)
GLUCOSE SERPLBLD-MCNC: 108 MG/DL (ref 74–118)
HCT VFR BLD AUTO: 38 % (ref 34.2–44.1)
HGB BLD-MCNC: 12.1 G/DL (ref 12–16)
HYPOCHROMIA BLD QL SMEAR: (no result)
LYMPHOCYTES # BLD: 0.4 10*3/UL (ref 1–3.2)
LYMPHOCYTES NFR BLD AUTO: 24.7 % (ref 18–39.1)
LYMPHOCYTES NFR BLD MANUAL: 30 % (ref 19–48)
MCH RBC QN AUTO: 31.1 PG (ref 28–32)
MCHC RBC AUTO-ENTMCNC: 31.8 G/DL (ref 31–35)
MCV RBC AUTO: 97.7 FL (ref 81–99)
MONOCYTES # BLD AUTO: 0.3 10*3/UL (ref 0.2–0.8)
MONOCYTES NFR BLD AUTO: 15.1 % (ref 4.4–11.3)
MONOCYTES NFR BLD MANUAL: 13 % (ref 3.4–9)
NEUTS SEG NFR BLD AUTO: 57.8 % (ref 38.7–80)
NEUTS SEG NFR BLD MANUAL: 56 % (ref 40–74)
PLAT MORPH BLD: (no result)
PLATELET # BLD AUTO: 269 X10E3/UL (ref 140–360)
PLATELET # BLD EST: ADEQUATE 10*3/UL
POTASSIUM SERPL-SCNC: 3.3 MMOL/L (ref 3.5–5.1)
RBC # BLD AUTO: 3.89 X10E6/UL (ref 3.6–5.1)
RBC MORPH BLD: NORMAL
SODIUM SERPL-SCNC: 144 MMOL/L (ref 136–145)
STOMATOCYTES BLD QL SMEAR: SLIGHT

## 2020-04-06 RX ADMIN — Medication PRN MG: at 17:24

## 2020-04-06 RX ADMIN — Medication PRN MG: at 21:43

## 2020-04-06 RX ADMIN — DEXTROSE, SODIUM CHLORIDE, AND POTASSIUM CHLORIDE SCH MLS/HR: 5; .9; .15 INJECTION INTRAVENOUS at 21:30

## 2020-04-06 RX ADMIN — SODIUM CHLORIDE SCH ML: 900 IRRIGANT IRRIGATION at 02:42

## 2020-04-06 RX ADMIN — METOPROLOL TARTRATE PRN MG: 1 INJECTION, SOLUTION INTRAVENOUS at 09:48

## 2020-04-06 RX ADMIN — SODIUM CHLORIDE SCH ML: 900 IRRIGANT IRRIGATION at 10:22

## 2020-04-06 RX ADMIN — ENOXAPARIN SODIUM SCH MG: 40 INJECTION SUBCUTANEOUS at 16:18

## 2020-04-06 RX ADMIN — Medication PRN MG: at 03:44

## 2020-04-06 RX ADMIN — SODIUM CHLORIDE SCH ML: 900 IRRIGANT IRRIGATION at 18:19

## 2020-04-06 RX ADMIN — Medication PRN MG: at 07:45

## 2020-04-06 RX ADMIN — DEXTROSE, SODIUM CHLORIDE, AND POTASSIUM CHLORIDE SCH MLS/HR: 5; .9; .15 INJECTION INTRAVENOUS at 16:18

## 2020-04-06 RX ADMIN — SODIUM CHLORIDE SCH ML: 900 IRRIGANT IRRIGATION at 14:15

## 2020-04-06 RX ADMIN — TAZOBACTAM SODIUM AND PIPERACILLIN SODIUM SCH GM: 375; 3 INJECTION, SOLUTION INTRAVENOUS at 22:00

## 2020-04-06 RX ADMIN — TAZOBACTAM SODIUM AND PIPERACILLIN SODIUM SCH GM: 375; 3 INJECTION, SOLUTION INTRAVENOUS at 14:19

## 2020-04-06 RX ADMIN — SODIUM CHLORIDE SCH ML: 900 IRRIGANT IRRIGATION at 22:00

## 2020-04-06 RX ADMIN — DEXTROSE, SODIUM CHLORIDE, AND POTASSIUM CHLORIDE SCH MLS/HR: 5; .9; .15 INJECTION INTRAVENOUS at 05:36

## 2020-04-06 RX ADMIN — SODIUM CHLORIDE PRN MG: 900 INJECTION INTRAVENOUS at 03:44

## 2020-04-06 RX ADMIN — SODIUM CHLORIDE SCH ML: 900 IRRIGANT IRRIGATION at 05:36

## 2020-04-06 RX ADMIN — Medication PRN MG: at 12:56

## 2020-04-06 RX ADMIN — TAZOBACTAM SODIUM AND PIPERACILLIN SODIUM SCH GM: 375; 3 INJECTION, SOLUTION INTRAVENOUS at 06:25

## 2020-04-06 RX ADMIN — SODIUM CHLORIDE PRN MG: 900 INJECTION INTRAVENOUS at 23:06

## 2020-04-06 NOTE — DIAGNOSTIC IMAGING REPORT
Two view abdomen series. 



CPT 07980



CLINICAL HISTORY: Small bowel obstruction



TECHNIQUE: Flat and upright views of the abdomen obtained.



COMPARISON: CT abdomen/pelvis 4/4/2020.



Medical Devices: Bilateral pedicle screws and vertebral stabilizing bars in L3

and L5 are intact.

Enteric tube terminates in the proximal stomach. There are stable multiple

staples in the left axilla.



Bowel: Distended small bowel loops measure up to 4.0 cm (previously, 6.1 cm. No

air-fluid levels or pneumatosis. Small amount of air in the ascending colon.



Calcifications: No calcifications over the renal shadows or along the expected

course of the ureters.



Organomegaly: None



Free air: None



Lung bases: Subsegmental lower lobe atelectasis. No discrete pulmonary masses.



Bones: Diffuse osseous metastases.



IMPRESSION:



1.  Improved small bowel dilatation.



2.  Diffuse osseous metastases.



3.  Bibasilar atelectasis.



Signed by: Dr. Juan Cannon MD on 4/6/2020 5:28 AM

## 2020-04-06 NOTE — NUR
Per Dr. Fenton, patient should be in reverse isolation. 

Charge notified and will call with new room assignment.



patient aware of transfer to a new room.

## 2020-04-06 NOTE — CONSULTATION
DATE OF CONSULTATION:  2020

 

Cardiology Consultation 

 

REASON FOR CONSULTATION:  History of atrial fibrillation and CHF.

 

HISTORY OF PRESENT ILLNESS:  Ms. Ellis is a 64-year-old lady with a past medical

history of hypertension, prior history of atrial fibrillation initially diagnosed in

2020 when she went to [facility] and at that time appeared to have

tachycardia induced cardiomyopathy with EF about 35%.  Her underlying disease process

includes COPD/smoking and breast cancer, initially diagnosed in 2015 with previous left

mastectomy, chemotherapy and radiation therapy.  She has been recently discovered to

have stage IV breast cancer with bony metastases.  In terms of abdominal history, she

reports history of remote appendectomy and subsequent inguinal hernia surgery and comes

in with new onset nausea, vomiting, inability to keep food down for the past 72 hours.

Every time she eats, she reports having bilious type of vomiting spell and has

associated upper abdominal firmness and pain and discomfort.  Essentially, the patient

came into this institution and upon imaging, she was found to have high-grade small

bowel obstruction and is hospitalized with this process.  Cardiology was consulted given

her history.  I spoke with the patient and daughter over the phone today.  She reports

for her atrial fibrillation, she has been initiated on digoxin monotherapy and aspirin

for thromboembolic prophylaxis.  She maintains no systemic anticoagulant use for this.

Currently, she has an NG tube to intermittent wall suction with resolution of abdominal

pain with bilious output noted.  She denies any fevers or chills and is feeling overall

better since coming to this institution.  On telemetry, the patient is in sinus

tachycardia. 

 

PAST MEDICAL HISTORY:  

1. Hypertension, essential.

2. Breast cancer, diagnosed initially in  with now stage IV with bony mets.

3. Anxiety.

4. History of atrial fibrillation diagnosed in 2020 at [facility] on

rate control therapy, not on anticoagulant therapy. 

5. CHF, systolic with EF of about 35%, reported by the patient back in 2020,

but suspect this is tachycardia induced cardiomyopathy as her EF here is 55% on the

echocardiogram today. 

 

PAST SURGICAL HISTORY:  

1. History of left mastectomy.

2. History of appendectomy.

3. History of abdominal wall hernia surgery.

 

FAMILY HISTORY:  Mother  at 87, had pacemaker in her early 80s.  Father  at 95

with old age.  Denies any family history of coronary artery disease. 

 

SOCIAL HISTORY:  She is an unfortunate quarter pack per day smoker.  Denies any alcohol

or illicit drug use. 

 

ALLERGIES:  INCLUDE ACETAMINOPHEN, CODEINE, HYDROCODONE, AND IODINE.

 

CURRENT HOME MEDICATIONS:  Include valsartan 80 mg daily, and tramadol p.r.n.,

tizanidine 4 mg at bedtime, Protonix 40 mg daily, Ibrance 100 mg daily, Remeron 45 mg at

bedtime, metoprolol 50 mg daily.  Melatonin 5 mg at bedtime, loratadine 10 mg daily,

gabapentin 300 mg at bedtime, digoxin 0.125 mg daily, vitamin D tablet daily, calcium

carbonate daily, Wellbutrin 300 mg at bedtime, aspirin 325 mg daily. 

 

REVIEW OF SYSTEMS:

GENERAL:  Denies any fever, chills.  Positive for weight loss. 

HEENT:  No headaches, visual complaints, sore throat, or stuffy nose. 

RESPIRATORY:  Denies any pleuritic chest pain, shortness of breath, or cough. 

CARDIOVASCULAR:  Occasional palpitations.  No syncope or near syncope.  Denies any chest

pain, discomfort, orthopnea, or PND. 

GI:  Positive for abdominal pain, not recurrent nausea, vomiting, and bilious output.

Denies any diarrhea, bright red blood per rectum, melena, or hematemesis. 

:  Denies any dysuria, pyuria, change in urinary frequency. 

MUSCULOSKELETAL:  Denies any extremity weakness.  Does have some bony pain from

metastatic disease. 

ENDOCRINE:  Denies any heat or cold intolerance. 

NEUROLOGIC:  Denies any focal weakness, numbness, tingling, seizures, headache, history

of TIA or stroke. 

 

Remainder review of systems negative otherwise mentioned.

 

PHYSICAL EXAMINATION:

VITAL SIGNS:  Height of 65 inches, weight of 158 pounds, BMI is 26.3, temperature of

98.2, pulse of 103, respiratory rate 18, blood pressure 151/86, O2 saturation 97% on

room air. 

GENERAL:  This is a well-nourished, well-developed lady, who is currently in no apparent

distress. 

HEENT:  Normocephalic and atraumatic.  Pupils are equal, round, and reactive to light.

Extraocular movements are intact.  Oropharynx is clear.  There is an NG tube to low

intermittent suction with bilious output. 

NECK:  No elevation of jugular venous pulsation.  No carotid bruits. 

CARDIOVASCULAR:  Tachycardic.  Normal S1, S2.  No gallops.  2/6 systolic murmur at the

right upper sternal border. 

LUNGS:  Shows mild-to-moderate decreased air flow throughout lung fields and diminished

air entry compatible with COPD type changes.  There is a left mastectomy noted. 

ABDOMEN:  Soft.  Discomfort to palpation of the mid epigastric and left upper quadrant

regions with some firmness.  There is hypoactive bowel sounds. 

BACK:  No costovertebral angle tenderness. 

EXTREMITIES:  Warm with 1 to 2+ bilateral radial pulses, 1+ pedal pulses.  No edema. 

NEUROLOGIC:  Cranial nerves II through XII are intact.  Strength seems to be preserved

and moves all four extremities. 

PSYCH:  Normal fluent speech.  Appropriate affect.  No anxiety or delusions.

LABORATORY DATA:  White count of 1.6, hemoglobin 12.1, hematocrit 38.0, and platelets of

269.  Sodium 144, potassium 3.3, chloride 107, bicarb 26, BUN 9, creatinine 0.68,

glucose of 108, calcium of 7.7.  Troponin is less than 0.01.  Lipase is less than 4.

AST 15, ALT 9, alkaline phosphatase 96, total protein 6.5, albumin of 3.5.  INR is 1.01.

 UA shows 6-10 white cells. 

 

Chest x-ray shows pulmonary hyperinflation, COPD type changes. 

 

Abdomen and pelvis CT reveals high-grade small bowel obstruction, postoperative changes

and diffuse intrahepatic and osseous metastases likely from breast cancer.  Abdominal

x-ray today shows improved small-bowel distention and diffuse osseous metastases and

bibasilar atelectasis. 

 

EKG reveals sinus tachycardia with intermittent PACs but no ST-T wave changes.

 

DIAGNOSES:  

1. Small bowel obstruction.

2. Leukopenia, likely from chemotherapy use.

3. Stage IV breast cancer.

4. Paroxysmal atrial fibrillation.

5. History of tachycardia induced cardiomyopathy with previous EF 35% and now EF is up

to 55% on echocardiogram today. 

 

PLAN/RECOMMENDATIONS:  

1. From a cardiovascular standpoint, we will put the patient on IV metoprolol therapy

given her n.p.o. state. 

2. Bowel management per General Surgery, currently on low intermittent suction with

improvement in symptoms. 

3. The patient is definitely neutropenic and we will defer to Heme-Onc for management of

this issue. 

4. Agree with empiric antibiotic therapy and is currently on Zosyn.

5. We do not recommend systemic anticoagulant therapy at this time and just maintain her

on DVT prophylaxis on account of perhaps need for surgery if the underlying condition

does not resolve itself. 

6. We will continue follow.

 

 

 

 

______________________________

MD MORE Carias/JESSIE

D:  2020 09:18:39

T:  2020 10:13:08

Job #:  [id number]

## 2020-04-06 NOTE — PROGRESS NOTE
DATE:  04/06/2020

 

Medicine Progress Note 

 

SUBJECTIVE:  The patient still has an NG tube.  Suppository was given, now with multiple

bowel movements.  I spoke with Hematology.  She was given some Neupogen to stimulate her

white count.  No overnight events. 

 

OBJECTIVE:  VITAL SIGNS:  Temperature 98.2, pulse 91, respiratory rate is 20, blood

pressure 127/76, pulse ox 92% on room air. 

GENERAL:  No acute distress.  Alert and oriented x3.  Cooperative on examination. 

HEENT:  Head is normocephalic and atraumatic.  Eyes; pupils are equal, round, and

reactive to light bilaterally.  Extraocular movements intact bilaterally.  Throat; no

evidence of erythema or exudates in the posterior pharynx.  Has poor dentition. 

NECK:  Supple.  Good range of motion PULMONARY:  Clear to auscultation bilaterally.  No

wheezing, rales, or rhonchi.  No crackles appreciated. 

CARDIOVASCULAR:  Positive S1 and S2.  No murmurs, rubs, or gallops. 

ABDOMEN:  Soft, nondistended, nontender to palpation.  Bowel sounds present. 

MUSCULOSKELETAL:  Strength is 5/5 throughout.  No evidence of any muscle deficits on

examination.  No weakness appreciated. 

NEUROLOGIC:  Cranial nerve II through XII are grossly intact.  No evidence of any

neurological deficits on exam. 

SKIN:  Intact.  Warm to touch.  Good cap refill. 

PSYCHIATRIC:  Normal affect and mood. 

EXTREMITIES:  No edema.  Good range of motion throughout.

 

LABORATORY DATA:  Labs show white count 1.6, hemoglobin 12, hematocrit 38, and platelets

of 269.  Chemistry; sodium 144, potassium 3.3, chloride 107, bicarb 26, anion gap of 14,

BUN is 9, creatinine is 0.68, calcium is 7.7. 

 

IMAGING STUDIES:  Today, abdominal x-ray shows improved small bowel dilatation.  Diffuse

osseous metastasis.  Bibasilar atelectasis. 

 

IMPRESSION:  

1. High-grade small bowel obstruction.

2. Breast cancer with metastasis.

3. Abdominal pain, nausea, and vomiting secondary to small bowel obstruction.

4. Recent diagnosis of atrial fibrillation.

5. Hypertension.

 

PLAN:  At this time, continue with NG tube.  KUB consistent with improving small bowel

obstruction.  She was given suppository with multiple bowel movements.  Repeat abdominal

x-ray in the morning, which demonstrated as following.  As for her breast cancer with

metastasis, she also has underlying leukopenia.  Hematology gave the patient Neupogen,

and we will monitor the white count very closely.  Cardiology evaluated the patient.

Continue with IV metoprolol for now, then discharge on rate control medications as well

as anticoagulation.  Blood pressure is stable.  She is still n.p.o. with ice chips only

with IV fluids.  Lovenox for deep venous thrombosis prophylaxis. 

 

Consultants involved:  Cardiology, General Surgery, and Hematology/Oncology.

 

 

 

 

______________________________

MD SELWYN Valentine/JESSIE

D:  04/06/2020 21:00:17

T:  04/06/2020 21:44:31

Job #:  [id number]

## 2020-04-06 NOTE — NUR
Pt received from [facility] 3 at this time. Pt is axo4 and able to verbalize needs. Denies any pain 
at this time. Pt has ngtube to left nare and will be connect to intermittent low wall 
suction.

## 2020-04-06 NOTE — CONSULTATION
DATE OF CONSULTATION:  04/06/2020  

 

REASON FOR CONSULTATION:  Stage IV breast cancer.

 

HISTORY OF PRESENT ILLNESS:  64-year-old female with a past medical history of stage IV

breast cancer, currently following [facility] [place].  She is currently on

Ibrance and Faslodex treatment.  She has bones and liver disease.  She was recently

hospitalized at [facility].  She is currently in the hospital with worsening

abdominal pain, nausea, and vomiting.  She also is complaining of worsening lethargy,

fatigue, and tiredness.  Her workup included CT of abdomen and pelvis is consistent with

high-grade small bowel obstruction without perforation.  I reviewed her CAT scan done

last month at [facility], did not show any obstruction.  She also had diffuse

osseous metastasis and liver metastatic disease. 

 

Her PVD shows hemoglobin of 11.9, white blood cells 3, and platelet count is 295.  She

denied any GI bleed.  No hematemesis, melena, or hematochezia noted.  Her CBC this

morning; hemoglobin stays in the 12 range, white count significantly dropped to the

1.66.  Her chemistry reported elevated alkaline phosphatase.  Coagulation profile was

stable.  She is following surgeon. 

 

PAST MEDICAL HISTORY:  Includes, stage IV breast, hypertension, GERD, neuropathy, and

atrial fibrillation. 

 

PAST SURGICAL HISTORY:  None.

 

FAMILY HISTORY:  Hypertension and diabetes.

 

SOCIAL HISTORY:  No habits.  Lives in [facility] for [facility] management.

 

REVIEW OF SYSTEMS:

As per HPI.

 

PHYSICAL EXAMINATION:

GENERAL:  Alert, awake, and communicative. 

HEENT:  Normocephalic and atraumatic.  Sclerae pale.  Conjunctivae clear.  NG tube in

place. 

NECK:  Supple. 

CHEST:  Clear to auscultation. 

ABDOMEN:  Soft and nontender. 

EXTREMITIES:  No clubbing, cyanosis, or edema. 

CNS:  Grossly intact.

LABS AND IMAGING:  Reviewed.

 

ASSESSMENT AND PLAN:  

1. The patient with history of multiple medical conditions includes stage IV breast

cancer, currently on hormonal treatment include Faslodex and Ibrance.  Currently in

hospital with high-grade small bowel obstruction.  The patient being followed with a

surgeon.  She is currently on conservative management including NG tube for

decompression of the stomach.  Plan is to do serial x-rays and labs. 

2. The patient with stage IV breast cancer.

3. Currently on Ibrance and Faslodex management.  Recent follow up with primary

oncology.  Chart reviewed.  The patient had persistent disease. 

4. Recommendation is continue oncology treatment as outpatient.

5. Worsening neutropenia.

6. White blood cell count went down to 1.66.

7. Medication related.

8. Recommendation is to give the patient a dose of Neupogen.  Monitor CBC.  Neutropenic

precautions.  We will follow the patient closely. 

 

Thank you Dr. Collins for this consultation.  We will follow the patient.

 

 

 

 

______________________________

MD CALE Daivla/JESSIE

D:  04/06/2020 08:58:47

T:  04/06/2020 10:01:29

Job #:  [id number]

## 2020-04-07 VITALS — DIASTOLIC BLOOD PRESSURE: 88 MMHG | SYSTOLIC BLOOD PRESSURE: 177 MMHG

## 2020-04-07 VITALS — SYSTOLIC BLOOD PRESSURE: 168 MMHG | DIASTOLIC BLOOD PRESSURE: 84 MMHG

## 2020-04-07 VITALS — SYSTOLIC BLOOD PRESSURE: 168 MMHG | DIASTOLIC BLOOD PRESSURE: 88 MMHG

## 2020-04-07 VITALS — SYSTOLIC BLOOD PRESSURE: 137 MMHG | DIASTOLIC BLOOD PRESSURE: 82 MMHG

## 2020-04-07 VITALS — DIASTOLIC BLOOD PRESSURE: 91 MMHG | SYSTOLIC BLOOD PRESSURE: 181 MMHG

## 2020-04-07 VITALS — DIASTOLIC BLOOD PRESSURE: 71 MMHG | SYSTOLIC BLOOD PRESSURE: 153 MMHG

## 2020-04-07 VITALS — SYSTOLIC BLOOD PRESSURE: 153 MMHG | DIASTOLIC BLOOD PRESSURE: 71 MMHG

## 2020-04-07 VITALS — SYSTOLIC BLOOD PRESSURE: 145 MMHG | DIASTOLIC BLOOD PRESSURE: 88 MMHG

## 2020-04-07 VITALS — SYSTOLIC BLOOD PRESSURE: 180 MMHG | DIASTOLIC BLOOD PRESSURE: 95 MMHG

## 2020-04-07 VITALS — SYSTOLIC BLOOD PRESSURE: 163 MMHG | DIASTOLIC BLOOD PRESSURE: 74 MMHG

## 2020-04-07 LAB
ANION GAP SERPL CALC-SCNC: 10.8 MMOL/L (ref 8–16)
ANISOCYTOSIS BLD QL SMEAR: SLIGHT
BASOPHILS # BLD AUTO: 0.1 10*3/UL (ref 0–0.1)
BASOPHILS NFR BLD AUTO: 1.4 % (ref 0–1)
BUN SERPL-MCNC: 7 MG/DL (ref 7–26)
BUN/CREAT SERPL: 12 (ref 6–25)
CALCIUM SERPL-MCNC: 8.2 MG/DL (ref 8.4–10.2)
CHLORIDE SERPL-SCNC: 110 MMOL/L (ref 98–107)
CO2 SERPL-SCNC: 25 MMOL/L (ref 22–29)
DEPRECATED NEUTROPHILS # BLD AUTO: 2.8 10*3/UL (ref 2.1–6.9)
EGFRCR SERPLBLD CKD-EPI 2021: > 60 ML/MIN (ref 60–?)
EOSINOPHIL # BLD AUTO: 0 10*3/UL (ref 0–0.4)
EOSINOPHIL NFR BLD AUTO: 0.8 % (ref 0–6)
EOSINOPHIL NFR BLD MANUAL: 2 % (ref 0–7)
ERYTHROCYTE [DISTWIDTH] IN CORD BLOOD: 16.1 % (ref 11.7–14.4)
GLUCOSE SERPLBLD-MCNC: 98 MG/DL (ref 74–118)
HCT VFR BLD AUTO: 35.1 % (ref 34.2–44.1)
HGB BLD-MCNC: 10.9 G/DL (ref 12–16)
LYMPHOCYTES # BLD: 0.4 10*3/UL (ref 1–3.2)
LYMPHOCYTES NFR BLD AUTO: 12 % (ref 18–39.1)
LYMPHOCYTES NFR BLD MANUAL: 13 % (ref 19–48)
MCH RBC QN AUTO: 30.6 PG (ref 28–32)
MCHC RBC AUTO-ENTMCNC: 31.1 G/DL (ref 31–35)
MCV RBC AUTO: 98.6 FL (ref 81–99)
MONOCYTES # BLD AUTO: 0.3 10*3/UL (ref 0.2–0.8)
MONOCYTES NFR BLD AUTO: 9.3 % (ref 4.4–11.3)
MONOCYTES NFR BLD MANUAL: 9 % (ref 3.4–9)
NEUTS BAND NFR BLD MANUAL: 5 %
NEUTS SEG NFR BLD AUTO: 75.1 % (ref 38.7–80)
NEUTS SEG NFR BLD MANUAL: 71 % (ref 40–74)
PLAT MORPH BLD: NORMAL
PLATELET # BLD AUTO: 228 X10E3/UL (ref 140–360)
PLATELET # BLD EST: ADEQUATE 10*3/UL
POTASSIUM SERPL-SCNC: 3.8 MMOL/L (ref 3.5–5.1)
RBC # BLD AUTO: 3.56 X10E6/UL (ref 3.6–5.1)
RBC MORPH BLD: NORMAL
SODIUM SERPL-SCNC: 142 MMOL/L (ref 136–145)

## 2020-04-07 RX ADMIN — SODIUM CHLORIDE PRN MG: 900 INJECTION INTRAVENOUS at 12:00

## 2020-04-07 RX ADMIN — SODIUM CHLORIDE SCH ML: 900 IRRIGANT IRRIGATION at 02:00

## 2020-04-07 RX ADMIN — Medication PRN MG: at 12:00

## 2020-04-07 RX ADMIN — SODIUM CHLORIDE SCH ML: 900 IRRIGANT IRRIGATION at 05:31

## 2020-04-07 RX ADMIN — Medication PRN MG: at 20:30

## 2020-04-07 RX ADMIN — SODIUM CHLORIDE PRN MG: 900 INJECTION INTRAVENOUS at 03:30

## 2020-04-07 RX ADMIN — TAZOBACTAM SODIUM AND PIPERACILLIN SODIUM SCH GM: 375; 3 INJECTION, SOLUTION INTRAVENOUS at 14:21

## 2020-04-07 RX ADMIN — ENOXAPARIN SODIUM SCH MG: 40 INJECTION SUBCUTANEOUS at 16:19

## 2020-04-07 RX ADMIN — TAZOBACTAM SODIUM AND PIPERACILLIN SODIUM SCH GM: 375; 3 INJECTION, SOLUTION INTRAVENOUS at 23:35

## 2020-04-07 RX ADMIN — SODIUM CHLORIDE PRN MG: 900 INJECTION INTRAVENOUS at 08:04

## 2020-04-07 RX ADMIN — SODIUM CHLORIDE SCH ML: 900 IRRIGANT IRRIGATION at 10:00

## 2020-04-07 RX ADMIN — METOPROLOL TARTRATE PRN MG: 1 INJECTION, SOLUTION INTRAVENOUS at 04:48

## 2020-04-07 RX ADMIN — SODIUM CHLORIDE SCH ML: 900 IRRIGANT IRRIGATION at 14:00

## 2020-04-07 RX ADMIN — PROMETHAZINE HYDROCHLORIDE PRN MG: 25 INJECTION, SOLUTION INTRAMUSCULAR; INTRAVENOUS at 23:12

## 2020-04-07 RX ADMIN — SODIUM CHLORIDE SCH ML: 900 IRRIGANT IRRIGATION at 19:05

## 2020-04-07 RX ADMIN — PROMETHAZINE HYDROCHLORIDE PRN MG: 25 INJECTION, SOLUTION INTRAMUSCULAR; INTRAVENOUS at 13:57

## 2020-04-07 RX ADMIN — Medication PRN MG: at 13:38

## 2020-04-07 RX ADMIN — Medication PRN MG: at 03:14

## 2020-04-07 RX ADMIN — SODIUM CHLORIDE SCH ML: 900 IRRIGANT IRRIGATION at 23:12

## 2020-04-07 RX ADMIN — Medication PRN MG: at 16:19

## 2020-04-07 RX ADMIN — TAZOBACTAM SODIUM AND PIPERACILLIN SODIUM SCH GM: 375; 3 INJECTION, SOLUTION INTRAVENOUS at 05:30

## 2020-04-07 RX ADMIN — BISACODYL SCH MG: 10 SUPPOSITORY RECTAL at 08:04

## 2020-04-07 RX ADMIN — Medication PRN MG: at 08:04

## 2020-04-07 RX ADMIN — SODIUM CHLORIDE PRN MG: 900 INJECTION INTRAVENOUS at 19:50

## 2020-04-07 NOTE — PROGRESS NOTE
DATE:  04/07/2020

 

Medicine Progress Note 

 

SUBJECTIVE:  The patient still has an NG tube, still has significant amount of output.

No overnight events. 

 

PHYSICAL EXAMINATION:

VITAL SIGNS:  Temperature is 98.4, pulse is 69, respiratory rate is 18, blood pressure

137/82, pulse ox is 99% on room air. 

GENERAL:  Not in acute distress.  Alert and oriented x3.  Cooperative on examination. 

HEENT:  Head is normocephalic, atraumatic.  Eyes; pupils are equal, round, and reactive

to light bilaterally.  Extraocular movements intact bilaterally.  Throat; no evidence of

erythema or exudates in the posterior pharynx.  Has poor dentition. 

NECK:  Supple.  Good range of motion. 

PULMONARY:  Clear to auscultation bilaterally.  No wheezing, rales, or rhonchi.  No

crackles appreciated. 

CARDIOVASCULAR:  Positive S1 and S2.  No murmurs, rubs, or gallops. 

ABDOMEN:  Soft, nondistended, and nontender to palpation.  Bowel sounds are present. 

MUSCULOSKELETAL:  Strength is 5/5 throughout.  No evidence of any muscle deficits on

examination.  No weakness appreciated. 

NEUROLOGIC:  Cranial nerves II through XII grossly intact.  No evidence of any

neurological deficits on exam. 

SKIN:  Intact.  Warm to touch.  Good cap refill. 

PSYCHIATRIC:  Normal affect and mood. 

EXTREMITIES:  No edema.  Good range of motion throughout.

LABORATORY DATA:  White count 3.6, hemoglobin 10.9, hematocrit 35, platelets of 228.

Coagulation, PT 13, INR 1, PTT 28.  Chemistry; sodium 142, potassium 3.8, chloride 110,

bicarb 25, anion gap of 10, BUN 7, creatinine is 0.57, glucose 98, calcium is 8.2.  LFTs

within normal range. 

 

MICROBIOLOGY:  None.

 

IMAGING STUDIES:  Abdominal x-ray performed on 04/07 this morning shows findings of

partial small bowel obstruction. 

 

IMPRESSION:  

1. High-grade small bowel obstruction.

2. Breast cancer with metastasis.

3. Abdominal pain, nausea, vomiting secondary to small bowel obstruction.

4. Recent diagnosis of atrial fibrillation.

5. Hypertension.

 

PLAN:  At this time, the patient continues to have NG tube with significant amount of

output.  KUB still shows evidence of a partial small bowel obstruction.  General Surgery

is following.  The patient will get a CT scan in the morning by General Surgery.  As for

her breast cancer with metastasis and leukopenia, white count has improved after given

Neupogen, which Hematology is following.  We will get a.m. labs.  Blood pressure

periodically goes up, but is being monitored very closely, which Cardiology has been

consulted.  Continue with n.p.o. except for ice chips and IV fluids.  We did start her

on IV PPN for nutrition.  She is on Lovenox for DVT prophylaxis. 

 

Consultants involved Cardiology, General surgeon, Hematology, Oncology.

 

 

 

 

______________________________

MD SELWYN Valentine/JESSIE

D:  04/07/2020 21:19:51

T:  04/07/2020 22:07:38

Job #:  [id number]

## 2020-04-07 NOTE — DIAGNOSTIC IMAGING REPORT
Exam: KUB -3 views 



Clinical History: Follow-up small bowel obstruction.



Comparison: KUB 4/6/2020 and CT abdomen/pelvis 4/4/2020.



Findings: 

Mild small bowel dilatation, similar to KUB on 4/6/2020, but improved from

4/4/2020. Some air is seen within the colon. No evidence of free

intraperitoneal air.



Enteric tube terminates in the proximal stomach. Surgical staples in the left

axilla.



Diffuse osseous metastases are again noted. Fixation hardware from L3 through

L5 is again noted.



Impression:

Findings of partial small bowel obstruction, similar to prior KUB.



Signed by: Dr. Kajal Foster MD on 4/7/2020 6:23 AM

## 2020-04-07 NOTE — PROGRESS NOTE
DATE:    

 

SUBJECTIVE:  The patient seen and examined today.  The patient appeared comfortable.

Clinical condition is same.  CAT scan shows persistent small bowel obstruction.  White

blood cells improved after Neupogen shot. 

 

OBJECTIVE:  GENERAL:  Alert, awake, communicative. 

HEENT:  Normocephalic and atraumatic.  Sclerae pale.  Conjunctivae clear. 

NECK:  Supple. 

CHEST:  Decreased breath sounds at the bases. 

ABDOMEN:  Soft, mildly tender. 

EXTREMITIES:  No edema.

 

LABS AND IMAGING:  Reviewed.

 

ASSESSMENT AND PLAN:  The patient with history of stage IV breast cancer, currently on

hormonal treatment include Faslodex and Ibrance.  Admitted with high-grade small bowel

obstruction, following surgeon, currently on conservative management.  Recent CAT scan

showed persistent small bowel obstruction.  She also following cardiologist for atrial

fibrillation.  The patient also had worsening neutropenia and required Neupogen shot. 

 

White blood cell count improving. 

 

Hemoglobin is stable. 

 

We will continue current care. 

 

We will follow surgical recommendation. 

 

Avoid frequent blood draw. 

 

Monitor CBC. 

 

Avoid neutropenic medication. 

 

We will follow the patient closely.

 

 

 

 

______________________________

MD CALE Davila/JESSIE

D:  04/07/2020 08:23:22

T:  04/07/2020 08:50:57

Job #:  [id number]

## 2020-04-07 NOTE — NUR
RECEIVED PATIENT IN BEDSIDE SHIFT REPORT. PATIENT RESTING IN BED. PAIN REPORTED 8/10, WILL 
MEDICATE WHEN TIME. NGT TO L NARE CONNECTED TO LIWS, DARK GREEN FLUID NOTED. TELE MONITOR 
ON. TPN RUNNING AT 80ML/HR TO R AC 20G, ASYMPTOMATIC. NO S&S OF DISTRESS NOTED. BED LOCKED 
IN LOWEST POSITION, SIDE RAILS UPX2, CALL LIGHT IN REACH.

## 2020-04-07 NOTE — NUR
IV in Right AC infiltrated. Old IV removed with tip intact. New IV inserted in R AC 20G. 
Patient tolerated well.

## 2020-04-08 VITALS — SYSTOLIC BLOOD PRESSURE: 148 MMHG | DIASTOLIC BLOOD PRESSURE: 70 MMHG

## 2020-04-08 VITALS — DIASTOLIC BLOOD PRESSURE: 93 MMHG | SYSTOLIC BLOOD PRESSURE: 169 MMHG

## 2020-04-08 VITALS — SYSTOLIC BLOOD PRESSURE: 136 MMHG | DIASTOLIC BLOOD PRESSURE: 69 MMHG

## 2020-04-08 VITALS — DIASTOLIC BLOOD PRESSURE: 87 MMHG | SYSTOLIC BLOOD PRESSURE: 177 MMHG

## 2020-04-08 VITALS — DIASTOLIC BLOOD PRESSURE: 70 MMHG | SYSTOLIC BLOOD PRESSURE: 148 MMHG

## 2020-04-08 VITALS — DIASTOLIC BLOOD PRESSURE: 80 MMHG | SYSTOLIC BLOOD PRESSURE: 147 MMHG

## 2020-04-08 VITALS — DIASTOLIC BLOOD PRESSURE: 88 MMHG | SYSTOLIC BLOOD PRESSURE: 170 MMHG

## 2020-04-08 LAB
ANION GAP SERPL CALC-SCNC: 15.1 MMOL/L (ref 8–16)
ANISOCYTOSIS BLD QL SMEAR: SLIGHT
BASOPHILS # BLD AUTO: 0.1 10*3/UL (ref 0–0.1)
BASOPHILS NFR BLD AUTO: 1.3 % (ref 0–1)
BUN SERPL-MCNC: 11 MG/DL (ref 7–26)
BUN/CREAT SERPL: 20 (ref 6–25)
CALCIUM SERPL-MCNC: 8.5 MG/DL (ref 8.4–10.2)
CHLORIDE SERPL-SCNC: 100 MMOL/L (ref 98–107)
CO2 SERPL-SCNC: 24 MMOL/L (ref 22–29)
DEPRECATED NEUTROPHILS # BLD AUTO: 3.8 10*3/UL (ref 2.1–6.9)
EGFRCR SERPLBLD CKD-EPI 2021: > 60 ML/MIN (ref 60–?)
EOSINOPHIL # BLD AUTO: 0 10*3/UL (ref 0–0.4)
EOSINOPHIL NFR BLD AUTO: 0.6 % (ref 0–6)
ERYTHROCYTE [DISTWIDTH] IN CORD BLOOD: 15.5 % (ref 11.7–14.4)
GLUCOSE SERPLBLD-MCNC: 110 MG/DL (ref 74–118)
HCT VFR BLD AUTO: 37.4 % (ref 34.2–44.1)
HGB BLD-MCNC: 12.3 G/DL (ref 12–16)
LYMPHOCYTES # BLD: 0.5 10*3/UL (ref 1–3.2)
LYMPHOCYTES NFR BLD AUTO: 9.6 % (ref 18–39.1)
LYMPHOCYTES NFR BLD MANUAL: 12 % (ref 19–48)
MCH RBC QN AUTO: 31.3 PG (ref 28–32)
MCHC RBC AUTO-ENTMCNC: 32.9 G/DL (ref 31–35)
MCV RBC AUTO: 95.2 FL (ref 81–99)
MONOCYTES # BLD AUTO: 0.6 10*3/UL (ref 0.2–0.8)
MONOCYTES NFR BLD AUTO: 11 % (ref 4.4–11.3)
MONOCYTES NFR BLD MANUAL: 9 % (ref 3.4–9)
MYELOCYTES NFR BLD MANUAL: 2 % (ref 0–0)
NEUTS SEG NFR BLD AUTO: 73.8 % (ref 38.7–80)
NEUTS SEG NFR BLD MANUAL: 76 % (ref 40–74)
NRBC/RBC NFR BLD MANUAL: 1 %
PLAT MORPH BLD: NORMAL
PLATELET # BLD AUTO: 242 X10E3/UL (ref 140–360)
PLATELET # BLD EST: ADEQUATE 10*3/UL
POTASSIUM SERPL-SCNC: 4.1 MMOL/L (ref 3.5–5.1)
RBC # BLD AUTO: 3.93 X10E6/UL (ref 3.6–5.1)
RBC MORPH BLD: NORMAL
SODIUM SERPL-SCNC: 135 MMOL/L (ref 136–145)

## 2020-04-08 RX ADMIN — SODIUM CHLORIDE SCH ML: 900 IRRIGANT IRRIGATION at 17:01

## 2020-04-08 RX ADMIN — Medication PRN MG: at 22:26

## 2020-04-08 RX ADMIN — SODIUM CHLORIDE SCH ML: 900 IRRIGANT IRRIGATION at 14:19

## 2020-04-08 RX ADMIN — Medication PRN MG: at 18:05

## 2020-04-08 RX ADMIN — Medication PRN MG: at 00:34

## 2020-04-08 RX ADMIN — TAZOBACTAM SODIUM AND PIPERACILLIN SODIUM SCH GM: 375; 3 INJECTION, SOLUTION INTRAVENOUS at 22:25

## 2020-04-08 RX ADMIN — PROMETHAZINE HYDROCHLORIDE PRN MG: 25 INJECTION, SOLUTION INTRAMUSCULAR; INTRAVENOUS at 10:47

## 2020-04-08 RX ADMIN — ENOXAPARIN SODIUM SCH MG: 40 INJECTION SUBCUTANEOUS at 17:01

## 2020-04-08 RX ADMIN — SODIUM CHLORIDE SCH ML: 900 IRRIGANT IRRIGATION at 09:23

## 2020-04-08 RX ADMIN — SODIUM CHLORIDE PRN MG: 900 INJECTION INTRAVENOUS at 02:35

## 2020-04-08 RX ADMIN — SODIUM CHLORIDE SCH ML: 900 IRRIGANT IRRIGATION at 22:25

## 2020-04-08 RX ADMIN — Medication PRN MG: at 10:47

## 2020-04-08 RX ADMIN — SODIUM CHLORIDE SCH ML: 900 IRRIGANT IRRIGATION at 02:41

## 2020-04-08 RX ADMIN — Medication PRN MG: at 04:42

## 2020-04-08 RX ADMIN — TAZOBACTAM SODIUM AND PIPERACILLIN SODIUM SCH GM: 375; 3 INJECTION, SOLUTION INTRAVENOUS at 14:41

## 2020-04-08 RX ADMIN — SODIUM CHLORIDE SCH ML: 900 IRRIGANT IRRIGATION at 06:27

## 2020-04-08 RX ADMIN — PROMETHAZINE HYDROCHLORIDE PRN MG: 25 INJECTION, SOLUTION INTRAMUSCULAR; INTRAVENOUS at 18:05

## 2020-04-08 RX ADMIN — PROMETHAZINE HYDROCHLORIDE PRN MG: 25 INJECTION, SOLUTION INTRAMUSCULAR; INTRAVENOUS at 05:04

## 2020-04-08 RX ADMIN — TAZOBACTAM SODIUM AND PIPERACILLIN SODIUM SCH GM: 375; 3 INJECTION, SOLUTION INTRAVENOUS at 06:27

## 2020-04-08 NOTE — NUR
PATIENT REQUESTED TAPE AROUND NGTUBE BE CHANGED. REMOVED OLD TAPE, NEW TAPE APPLIED. NO 
MOVEMENT NOTED TO LENGTH OF TUBING. DRAINING DARK GREEN FLUID TO LIWS. WILL CONTINUE TO 
MONITOR.

## 2020-04-08 NOTE — DIAGNOSTIC IMAGING REPORT
TECHNIQUE: CT of the abdomen and pelvis WITHOUT intravenous contrast and WITH

oral contrast. Dose modulation, iterative reconstruction, and/or weight-based

adjustment of the mA/kV was utilized to reduce the radiation dose to as low as

reasonably achievable.



INDICATION: 

^SBO

^[id number]

^1400

^Y.



COMPARISON: CT from 4/4/2020.





FINDINGS:



ABSENCE OF INTRAVENOUS CONTRAST DECREASES SENSITIVITY FOR DETECTION OF FOCAL

LESIONS AND VASCULAR PATHOLOGY.



LOWER THORAX: Partially visualized coronary arterial calcifications.

Subcentimeter bilateral lower lobe calcified granulomas.



HEPATOBILIARY: There are several (greater than 20) focal hepatic lesions which

are unchanged and measure up to 2.3 cm in segment V. A cyst in segment IV

measures 0.8 cm. The gallbladder is distended. No biliary ductal dilatation.

SPLEEN: No splenomegaly.

PANCREAS: No focal masses or ductal dilatation.



ADRENALS: No adrenal nodules.

KIDNEYS/URETERS: No hydronephrosis, stones, or exophytic masses.

PELVIC ORGANS/BLADDER: Unremarkable.



PERITONEUM/RETROPERITONEUM: No free air or fluid. Mild mesenteric edema.

LYMPH NODES: No lymphadenopathy.

VESSELS: Marked calcification of the aortoiliac vascular.



GI TRACT: Prior appendectomy. A NG tube has its tip in the gastric body. The

small bowel is diffusely dilated immediately proximal to a small bowel

anastomosis and measures 26.9 cm in diameter. The distal small bowel and colon

are completely decompressed.



BONES AND SOFT TISSUES: There is sclerotic lesions throughout the axial and

appendicular skeleton with a prior posterior fusion from L3 through L5 with a

laminectomy at L4. There is mild intracanal extension of a metastasis at L4.

The compression deformity of the inferior endplate of T11 is unchanged.





IMPRESSION:



1.  High-grade, likely complete small bowel obstruction with a transition point

at the site of a small bowel anastomosis. The mild mesenteric edema is

unchanged.



2.  The diffusely sclerotic bony metastases in the axial and appendicular

skeleton are unchanged. The multiple liver metastases are unchanged.



Signed by: Pollo Hernandez JR, MD on 4/8/2020 2:50 PM

## 2020-04-08 NOTE — PROGRESS NOTE
DATE:    

 

SUBJECTIVE:  The patient complains of crampy pain, has not passed any gas, has not had a

bowel movement.  She wants the NG tube out. 

 

OBJECTIVE:  VITAL SIGNS:  The patient is afebrile.  Vital signs are stable. 

ABDOMEN:  Examination of the abdomen reveals an abdomen that is mildly distended, firm,

but there are no peritoneal signs.  Bowel sounds are present.  CT scan today shows

persistent small bowel obstruction likely complete. 

 

LABORATORY DATA:  Reveals a white count of 5.2 with a hematocrit of 37 and platelet

count is 242.  Electrolytes are normal. 

 

ASSESSMENT:  Small bowel obstruction, persistent, likely complete.  At this point, given

her clinical as well as radiologic course, she has failed conservative treatment. 

 

PLAN:  To proceed with exploratory laparotomy, possible bowel resection tomorrow.  I

have discussed in detail both with the patient and her daughter, who lives in [place], [place].  The situation, the indications for surgery, benefits, risks, they all agree that

at this point, surgery is to 

be only a course to be taken.  We will proceed with exploratory laparotomy and possible

bowel resection tomorrow.  The potential risks have been discussed with them. 

 

 

 

 

______________________________

MD MIKE Segovia/JESSIE

D:  04/08/2020 19:25:41

T:  04/08/2020 19:43:00

Job #:  [id number]

## 2020-04-08 NOTE — DIAGNOSTIC IMAGING REPORT
Abdomen one view



Comparison: 4/6/2020



History: Small bowel obstruction



Findings:



2 AP images of the abdomen were obtained.



There has been an interval decrease in the number of and dilatation of the

small bowel loops in the left abdomen, suggestive of improving small bowel

obstruction.



No dilated large bowel loops are visualized.



Lung bases are clear.



There are postoperative changes in the lower lumbar spine.



Multiple blastic bone lesions are noted throughout the imaged skeleton,

concerning for osseous metastatic disease.



Impression: Interval decrease in the dilated small bowel loops suggestive of

improving small bowel obstruction.



Signed by: Derrick Galloway MD on 4/8/2020 8:29 AM

## 2020-04-08 NOTE — PROGRESS NOTE
DATE:    

 

SUBJECTIVE:  The patient seen and examined today.  The patient appears comfortable,

clinically doing better.  No other event noted.  Current blood count improving. 

 

PHYSICAL EXAMINATION:

GENERAL:  Alert, awake, and communicative. 

HEENT:  Normocephalic and atraumatic.  Sclerae pale.  Conjunctivae clear. 

NECK:  Supple. 

CHEST:  Decreased breath sounds at the bases. 

ABDOMEN:  Soft. 

EXTREMITIES:  No edema.

LABS AND IMAGING:  Reviewed.

 

ASSESSMENT:  The patient with a history of high-grade small bowel obstruction and breast

cancer with metastases, currently in hospital with worsening abdominal pain, nausea, and

vomiting secondary to small bowel obstruction.  The patient is currently following with

surgeon.  Possibility of CAT scan.  The patient's white blood cell count improved after

Neupogen injections.  Hemoglobin baseline.  Clinical condition has improved. 

 

RECOMMENDATIONS:  

1. Continue current care.

2. Monitor CBC closely.

3. Oncological management as outpatient.

4. We will follow surgical recommendations and we will follow the patient closely.

 

 

 

 

______________________________

MD CALE Davila/JESSIE

D:  04/08/2020 08:24:02

T:  04/08/2020 09:19:54

Job #:  [id number]

## 2020-04-08 NOTE — PROGRESS NOTE
DATE:  04/08/2020  

 

SUBJECTIVE:  The patient still has small bowel obstruction.  She is scheduled for

surgery tomorrow.  I already spoke with Dr. Jacobs, General Surgery. 

 

OBJECTIVE:  VITAL SIGNS:  She is afebrile, temperature is 97.7, pulse 95, respiratory

rate 16, blood pressure 116/93, pulse ox 96% on room air. 

GENERAL:  Not in acute distress.  Alert and oriented x3.  Cooperative on examination. 

HEENT:  Head is normocephalic and atraumatic.  Eyes; pupils are equal, round, and

reactive to light bilaterally.  Extraocular movements intact bilaterally.  Throat; no

evidence of erythema or exudates in the posterior pharynx.  She has poor dentition. 

NECK:  Supple.  Good range of motion. 

PULMONARY:  Clear to auscultation bilaterally.  No wheezing, rales, or rhonchi.  No

crackles appreciated. 

CARDIOVASCULAR:  Positive S1 and S2.  No murmurs, rubs, or gallops. 

GI:  Abdomen is soft, nondistended, and nontender to palpation.  Bowel sounds are

present.  NG tube in place with intermittent wall suction. 

MUSCULOSKELETAL:  Strength is 5/5 throughout.  No evidence of any muscle deficits on

examination.  No weakness appreciated. 

NEUROLOGIC:  Cranial nerves II through XII grossly intact.  No evidence of any

neurological deficits on exam. 

SKIN:  Intact.  Warm to touch.  Good cap refill. 

PSYCHIATRIC:  Normal affect and mood. 

EXTREMITIES:  No edema.  Good range of motion throughout.

 

LABORATORY DATA:  Show white count 5.2, hemoglobin 12, hematocrit 37, and platelets of

242.  Chemistry; sodium 135, potassium 4.1, chloride 100, bicarb 24, anion gap of 15,

BUN is 11, creatinine is 0.54. 

 

IMAGING STUDIES:  CT abdomen and pelvis shows a high-grade likely complete small bowel

obstruction with a transition point at the site of the small bowel anastomosis.  Mild

mesenteric edema is unchanged.  There is diffuse sclerotic bony metastasis in the axilla

and appendicular skeleton, unchanged.  Multiple liver metastases, unchanged. 

 

IMPRESSION:  

1. High-grade small bowel obstruction.

2. Breast cancer with metastasis.

3. Abdominal pain, nausea, and vomiting secondary to small bowel obstruction.

4. Recent diagnosis of atrial fibrillation.

5. Hypertension.

 

PLAN:  At this time, she still has an NG tube to intermittent wall suction.  CT abdomen

and pelvis consistent with small bowel obstruction.  I spoke with General Surgery.  He

has agreed to take the patient to surgery tomorrow.  She had a 2D echo that showed a

good EF.  In terms of her breast cancer, her leukopenia has improved.  Hematology is

following.  Get a.m. labs.  She is 

still n.p.o.  Lovenox for DVT prophylaxis.  IV PPN for nutrition.  Consultants involved'

Cardiology, General Surgery, and Hematology/Oncology. 

 

 

 

 

______________________________

MD SELWYN Valentine/JESSIE

D:  04/08/2020 21:26:28

T:  04/08/2020 21:37:51

Job #:  [id number]

## 2020-04-09 VITALS — DIASTOLIC BLOOD PRESSURE: 74 MMHG | SYSTOLIC BLOOD PRESSURE: 140 MMHG

## 2020-04-09 VITALS — DIASTOLIC BLOOD PRESSURE: 81 MMHG | SYSTOLIC BLOOD PRESSURE: 144 MMHG

## 2020-04-09 VITALS — DIASTOLIC BLOOD PRESSURE: 61 MMHG | SYSTOLIC BLOOD PRESSURE: 106 MMHG

## 2020-04-09 VITALS — DIASTOLIC BLOOD PRESSURE: 69 MMHG | SYSTOLIC BLOOD PRESSURE: 103 MMHG

## 2020-04-09 VITALS — SYSTOLIC BLOOD PRESSURE: 148 MMHG | DIASTOLIC BLOOD PRESSURE: 77 MMHG

## 2020-04-09 VITALS — SYSTOLIC BLOOD PRESSURE: 106 MMHG | DIASTOLIC BLOOD PRESSURE: 61 MMHG

## 2020-04-09 VITALS — SYSTOLIC BLOOD PRESSURE: 140 MMHG | DIASTOLIC BLOOD PRESSURE: 74 MMHG

## 2020-04-09 VITALS — SYSTOLIC BLOOD PRESSURE: 128 MMHG | DIASTOLIC BLOOD PRESSURE: 69 MMHG

## 2020-04-09 LAB
ANION GAP SERPL CALC-SCNC: 12 MMOL/L (ref 8–16)
ANISOCYTOSIS BLD QL SMEAR: SLIGHT
BASOPHILS # BLD AUTO: 0.1 10*3/UL (ref 0–0.1)
BASOPHILS NFR BLD AUTO: 1.3 % (ref 0–1)
BUN SERPL-MCNC: 15 MG/DL (ref 7–26)
BUN/CREAT SERPL: 23 (ref 6–25)
CALCIUM SERPL-MCNC: 8.3 MG/DL (ref 8.4–10.2)
CHLORIDE SERPL-SCNC: 102 MMOL/L (ref 98–107)
CO2 SERPL-SCNC: 25 MMOL/L (ref 22–29)
DEPRECATED NEUTROPHILS # BLD AUTO: 3.9 10*3/UL (ref 2.1–6.9)
EGFRCR SERPLBLD CKD-EPI 2021: > 60 ML/MIN (ref 60–?)
EOSINOPHIL # BLD AUTO: 0.1 10*3/UL (ref 0–0.4)
EOSINOPHIL NFR BLD AUTO: 0.9 % (ref 0–6)
ERYTHROCYTE [DISTWIDTH] IN CORD BLOOD: 15.8 % (ref 11.7–14.4)
GLUCOSE SERPLBLD-MCNC: 136 MG/DL (ref 74–118)
HCT VFR BLD AUTO: 37.6 % (ref 34.2–44.1)
HGB BLD-MCNC: 12.5 G/DL (ref 12–16)
LYMPHOCYTES # BLD: 0.5 10*3/UL (ref 1–3.2)
LYMPHOCYTES NFR BLD AUTO: 9.3 % (ref 18–39.1)
LYMPHOCYTES NFR BLD MANUAL: 11 % (ref 19–48)
MCH RBC QN AUTO: 31.4 PG (ref 28–32)
MCHC RBC AUTO-ENTMCNC: 33.2 G/DL (ref 31–35)
MCV RBC AUTO: 94.5 FL (ref 81–99)
MONOCYTES # BLD AUTO: 0.6 10*3/UL (ref 0.2–0.8)
MONOCYTES NFR BLD AUTO: 11.8 % (ref 4.4–11.3)
MONOCYTES NFR BLD MANUAL: 7 % (ref 3.4–9)
NEUTS SEG NFR BLD AUTO: 73 % (ref 38.7–80)
NEUTS SEG NFR BLD MANUAL: 80 % (ref 40–74)
PLAT MORPH BLD: NORMAL
PLATELET # BLD AUTO: 246 X10E3/UL (ref 140–360)
PLATELET # BLD EST: ADEQUATE 10*3/UL
POTASSIUM SERPL-SCNC: 4 MMOL/L (ref 3.5–5.1)
RBC # BLD AUTO: 3.98 X10E6/UL (ref 3.6–5.1)
RBC MORPH BLD: NORMAL
SODIUM SERPL-SCNC: 135 MMOL/L (ref 136–145)

## 2020-04-09 PROCEDURE — 0DBA0ZZ EXCISION OF JEJUNUM, OPEN APPROACH: ICD-10-PCS | Performed by: SURGERY

## 2020-04-09 RX ADMIN — SODIUM CHLORIDE SCH ML: 900 IRRIGANT IRRIGATION at 20:19

## 2020-04-09 RX ADMIN — Medication PRN MG: at 10:22

## 2020-04-09 RX ADMIN — SODIUM CHLORIDE PRN MG: 900 INJECTION INTRAVENOUS at 10:23

## 2020-04-09 RX ADMIN — SODIUM CHLORIDE SCH ML: 900 IRRIGANT IRRIGATION at 06:04

## 2020-04-09 RX ADMIN — SODIUM CHLORIDE SCH ML: 900 IRRIGANT IRRIGATION at 06:03

## 2020-04-09 RX ADMIN — TAZOBACTAM SODIUM AND PIPERACILLIN SODIUM SCH GM: 375; 3 INJECTION, SOLUTION INTRAVENOUS at 14:00

## 2020-04-09 RX ADMIN — TAZOBACTAM SODIUM AND PIPERACILLIN SODIUM SCH GM: 375; 3 INJECTION, SOLUTION INTRAVENOUS at 21:56

## 2020-04-09 RX ADMIN — SODIUM CHLORIDE SCH ML: 900 IRRIGANT IRRIGATION at 22:47

## 2020-04-09 RX ADMIN — SODIUM CHLORIDE SCH ML: 900 IRRIGANT IRRIGATION at 14:45

## 2020-04-09 RX ADMIN — TAZOBACTAM SODIUM AND PIPERACILLIN SODIUM SCH GM: 375; 3 INJECTION, SOLUTION INTRAVENOUS at 06:00

## 2020-04-09 RX ADMIN — SODIUM CHLORIDE SCH ML: 900 IRRIGANT IRRIGATION at 10:18

## 2020-04-09 NOTE — PROGRESS NOTE
DATE:  04/09/2020

 

Medicine Progress Note 

 

SUBJECTIVE:  The patient underwent surgery during this afternoon.  No overnight events.

 

PHYSICAL EXAMINATION:

VITAL SIGNS:  Temperature is 97.3, pulse is 124, respiratory rate is 19, blood pressure

is 106/61, pulse ox 92% on room air. 

GENERAL:  No acute distress.  Alert and oriented x3.  Cooperative on examination. 

HEENT:  Head is normocephalic, atraumatic.  Eyes; pupils are equal, round, and reactive

to light bilaterally.  Extraocular movements intact bilaterally.  Throat; no evidence of

erythema or exudates in the posterior pharynx.  Has poor dentition. 

NECK:  Supple.  Good range of motion. 

PULMONARY:  Clear to auscultation bilaterally.  No wheezing, rales, or rhonchi.  No

crackles appreciated. 

CARDIOVASCULAR:  Positive S1, S2.  No murmurs, rubs, or gallops appreciated. 

ABDOMEN:  Soft, nondistended, and nontender to palpation.  Bowel sounds are present. 

MUSCULOSKELETAL:  Strength is 5/5 throughout.  No evidence of any muscle deficits on

examination.  No weakness appreciated. 

NEUROLOGIC:  Cranial nerves II through XII grossly intact.  No evidence of any

neurological deficits on exam. 

SKIN:  Intact.  Warm to touch.  Good cap refill. 

PSYCHIATRIC:  Normal affect and mood. 

EXTREMITIES:  No edema.  Good range of motion throughout.

LABORATORY DATA:  Show white count 5.3, hemoglobin 12.5, hematocrit is 37.6, platelets

of 246.  Chemistry; sodium 135, potassium 4, chloride 102, bicarb 25, anion gap of 12,

BUN is 15, creatinine 0.64, calcium is 8.3, glucose is 136. 

 

IMAGING STUDIES:  None today.

 

IMPRESSION:  

1. Small bowel obstruction, status post exploratory laparotomy with small-bowel

resection performed on 04/09/2020 by General Surgery. 

2. Breast cancer with metastases.

3. Abdominal pain, nausea, and vomiting secondary to small bowel obstruction, status

post resection. 

4. Recent diagnosis of atrial fibrillation.

5. Hypertension.

 

PLAN:  At this time, continue with postop care and pain control.  She is still n.p.o.

Continue with IV PPN for now.  We will go ahead and order a PICC line tomorrow likely

will need TPN now.  Get a.m. labs.  Discussed plan of care with nursing staff. 

 

CONSULTANTS:  Involved Cardiology, General Surgery, and Hematology-Oncology.

 

 

 

 

______________________________

MD SELWYN Valentine/JESSIE

D:  04/09/2020 21:33:36

T:  04/09/2020 21:59:54

Job #:  [id number]

## 2020-04-09 NOTE — NUR
PT RETURN TO UNIT AROUSAL TO NAME AND TOUCH, MARTINEZ TO GRAVITY, PT HAS PCA PUMP IN USE AT 
THIS TIME, DILAUDID TO DEMAND ONLY, PT VITAL SIGNS STABLE, RESP EVEN AND UNLABORED, PT NGT 
TO LIWS AT THIS TIME. CALL LIGHT IN REACH AND PCA BUTTON IN HAND. WILL CONT TO MONITOR.

## 2020-04-09 NOTE — NUR
PCA pump 1 hr limit was set to 2.0mg. MD ordered 1.8 mg. Pump set as ordered. House 
supervisor made aware. Patient is alert. No complain at this time. Will monitor closely.

## 2020-04-09 NOTE — OPERATIVE REPORT
DATE OF PROCEDURE:  04/09/2020

 

SURGEON:  Stone Jacobs MD

 

PREOPERATIVE DIAGNOSIS:  Small bowel obstruction.

 

POSTOPERATIVE DIAGNOSIS:  Complete small bowel obstruction secondary to closed 
loop

obstruction due to an adhesive band of the small bowel. 

 

PROCEDURE PERFORMED:  Exploratory laparotomy, small-bowel resection.

 

ASSISTANT:  Drew Mathews. LSA

 

ESTIMATED BLOOD LOSS:  Minimal, less than 100 mL.

 

COMPLICATIONS:  None.

 

DRAINS:  None.

 

INDICATIONS AND FINDINGS:The patient is a pleasant, but unfortunate 64-year old 
female

with advanced metastatic breast cancer, was admitted with a bowel obstruction.  
The

patient was treated conservatively with NG tube decompression and serial x-rays.
 The

patient had a CT scan prior to surgery that revealed a complete small bowel 
obstruction.

 She was taken to the operating room because she did not respond to conservative

treatment. 

 

INTRAOPERATIVE FINDINGS:  A closed-loop obstruction of the small bowel secondary
to an

adhesive band.  There was about a foot of small bowel that was obstructed 
creating a

closed loop obstruction.  This was located in the distal jejunum and proximal 
ileum.

Points of obstruction both proximally and distally to the small bowel blind loop
were

the pressure was placed on the bowel were ischemic and we decided to resect 
about a feet of small

bowel for a total of about a feet and half of small bowel.Incorporating these 
ischemic points.

The patient had still very large amount of small bowel so that a short-gut 
syndrome was possible

  Side-to-side functional [id number] was created with a staple. The 
small bowel was ran from the

ligament of Treitz to the anastomosis with the colon and no lesions or 
strictures were found.The liver had 

 multiple metastasis in both lobes nodular rangin is size from one to 3three 
cms.

DESCRIPTION OF THE PROCEDURE:  With the patient lying on the operative table in 
the

supine position after administration of general anesthesia, she was prepped and 
draped

for exploratory laparotomy.  An incision was made inferior to the umbilicus, 
extending

from the umbilicus to the pelvis, then to the pubic tubercle and upon entering 
the

abdomen the findings noted above were seen.  The small bowel was viable except 
to point

where the closed-loop obstruction had been created by the band.  At this point, 
the

decision was made to resect those two areas and then about a foot and a half of 
small

bowel was resected with using the EnSeal instrument to handle the blood supply 
and then

we performed a side-to-side anastomosis functional end-to-end by anastomosing 
the small

bowel to itself proximally and distally by firing the DENISE 75 stapler and then 
closing the rent

of the small bowel segments with the TA 60 stapler.At this point,the anastomosis
was widely patent and viable.  

We went ahead and reinforced the anastomosis with 3-0 silk.  We placed a couple 
of stay

sutures distal to the anastomosis to reduce tension using 3-0 silk and then we

reinforced the suture line of the staple line with silk also.  The rent of the 
mesentery was

closed using a running 2-0 Vicryl.  The omentum was placed over the anastomosis 
and then

the wound was closed in two layers after the sponge and instrument counts were

pronounced correct using 0-Vicryl for the peritoneum and posterior sheath.  The 
same was

employed on the fascia.  The soft tissues were irrigated and then the fat was 
closed

using interrupted 2-0 catgut and the skin was closed with staples.  Sterile 
dressing was applied. 

 The patient tolerated the procedurewell, was taken to recovery room in stable 
condition. 

 

 

 

 

______________________________

MD MIKE Segovia/JESSIE

D:  04/09/2020 14:45:06

T:  04/09/2020 15:47:10

Job #:  [id number]

 

CHACORTA

## 2020-04-10 VITALS — DIASTOLIC BLOOD PRESSURE: 83 MMHG | SYSTOLIC BLOOD PRESSURE: 140 MMHG

## 2020-04-10 VITALS — DIASTOLIC BLOOD PRESSURE: 70 MMHG | SYSTOLIC BLOOD PRESSURE: 134 MMHG

## 2020-04-10 VITALS — SYSTOLIC BLOOD PRESSURE: 115 MMHG | DIASTOLIC BLOOD PRESSURE: 73 MMHG

## 2020-04-10 VITALS — DIASTOLIC BLOOD PRESSURE: 63 MMHG | SYSTOLIC BLOOD PRESSURE: 135 MMHG

## 2020-04-10 VITALS — SYSTOLIC BLOOD PRESSURE: 168 MMHG | DIASTOLIC BLOOD PRESSURE: 98 MMHG

## 2020-04-10 VITALS — SYSTOLIC BLOOD PRESSURE: 135 MMHG | DIASTOLIC BLOOD PRESSURE: 63 MMHG

## 2020-04-10 VITALS — DIASTOLIC BLOOD PRESSURE: 61 MMHG | SYSTOLIC BLOOD PRESSURE: 124 MMHG

## 2020-04-10 LAB
ANION GAP SERPL CALC-SCNC: 12 MMOL/L (ref 8–16)
BASOPHILS # BLD AUTO: 0.1 10*3/UL (ref 0–0.1)
BASOPHILS NFR BLD AUTO: 1.5 % (ref 0–1)
BUN SERPL-MCNC: 23 MG/DL (ref 7–26)
BUN/CREAT SERPL: 32 (ref 6–25)
CALCIUM SERPL-MCNC: 7.7 MG/DL (ref 8.4–10.2)
CHLORIDE SERPL-SCNC: 106 MMOL/L (ref 98–107)
CO2 SERPL-SCNC: 22 MMOL/L (ref 22–29)
DEPRECATED NEUTROPHILS # BLD AUTO: 3.4 10*3/UL (ref 2.1–6.9)
EGFRCR SERPLBLD CKD-EPI 2021: > 60 ML/MIN (ref 60–?)
EOSINOPHIL # BLD AUTO: 0 10*3/UL (ref 0–0.4)
EOSINOPHIL NFR BLD AUTO: 0 % (ref 0–6)
ERYTHROCYTE [DISTWIDTH] IN CORD BLOOD: 15.9 % (ref 11.7–14.4)
GLUCOSE SERPLBLD-MCNC: 138 MG/DL (ref 74–118)
HCT VFR BLD AUTO: 37 % (ref 34.2–44.1)
HGB BLD-MCNC: 12.1 G/DL (ref 12–16)
LYMPHOCYTES # BLD: 0.4 10*3/UL (ref 1–3.2)
LYMPHOCYTES NFR BLD AUTO: 7.8 % (ref 18–39.1)
MCH RBC QN AUTO: 31.1 PG (ref 28–32)
MCHC RBC AUTO-ENTMCNC: 32.7 G/DL (ref 31–35)
MCV RBC AUTO: 95.1 FL (ref 81–99)
MONOCYTES # BLD AUTO: 0.6 10*3/UL (ref 0.2–0.8)
MONOCYTES NFR BLD AUTO: 12 % (ref 4.4–11.3)
NEUTS SEG NFR BLD AUTO: 75 % (ref 38.7–80)
PLATELET # BLD AUTO: 248 X10E3/UL (ref 140–360)
POTASSIUM SERPL-SCNC: 5 MMOL/L (ref 3.5–5.1)
RBC # BLD AUTO: 3.89 X10E6/UL (ref 3.6–5.1)
SODIUM SERPL-SCNC: 135 MMOL/L (ref 136–145)

## 2020-04-10 PROCEDURE — B548ZZA ULTRASONOGRAPHY OF SUPERIOR VENA CAVA, GUIDANCE: ICD-10-PCS | Performed by: INTERNAL MEDICINE

## 2020-04-10 PROCEDURE — 02HV33Z INSERTION OF INFUSION DEVICE INTO SUPERIOR VENA CAVA, PERCUTANEOUS APPROACH: ICD-10-PCS | Performed by: INTERNAL MEDICINE

## 2020-04-10 PROCEDURE — 3E0436Z INTRODUCTION OF NUTRITIONAL SUBSTANCE INTO CENTRAL VEIN, PERCUTANEOUS APPROACH: ICD-10-PCS | Performed by: INTERNAL MEDICINE

## 2020-04-10 RX ADMIN — SODIUM CHLORIDE SCH ML: 900 IRRIGANT IRRIGATION at 14:45

## 2020-04-10 RX ADMIN — SODIUM CHLORIDE SCH ML: 900 IRRIGANT IRRIGATION at 06:29

## 2020-04-10 RX ADMIN — SODIUM CHLORIDE SCH ML: 900 IRRIGANT IRRIGATION at 10:45

## 2020-04-10 RX ADMIN — TAZOBACTAM SODIUM AND PIPERACILLIN SODIUM SCH GM: 375; 3 INJECTION, SOLUTION INTRAVENOUS at 14:26

## 2020-04-10 RX ADMIN — METOPROLOL TARTRATE PRN MG: 1 INJECTION, SOLUTION INTRAVENOUS at 05:02

## 2020-04-10 RX ADMIN — TAZOBACTAM SODIUM AND PIPERACILLIN SODIUM SCH GM: 375; 3 INJECTION, SOLUTION INTRAVENOUS at 22:24

## 2020-04-10 RX ADMIN — SODIUM CHLORIDE SCH ML: 900 IRRIGANT IRRIGATION at 18:45

## 2020-04-10 RX ADMIN — SODIUM CHLORIDE SCH ML: 900 IRRIGANT IRRIGATION at 23:21

## 2020-04-10 RX ADMIN — TAZOBACTAM SODIUM AND PIPERACILLIN SODIUM SCH GM: 375; 3 INJECTION, SOLUTION INTRAVENOUS at 05:45

## 2020-04-10 RX ADMIN — SODIUM CHLORIDE SCH ML: 900 IRRIGANT IRRIGATION at 03:22

## 2020-04-10 NOTE — PROGRESS NOTE
DATE:  04/10/2020

 

Medicine Progress Note 

 

SUBJECTIVE:  The patient is doing well postoperatively.  She is on a Dilaudid PCA pump.

She still has an NG tube to intermittent wall suctioning.  She is on IV TPN at.

Currently doing well. 

 

OBJECTIVE:  VITAL SIGNS:  Temperature is 98.7, pulse 113, respiratory rate is 20, blood

pressure is 140/83, pulse ox 98% on room air. 

GENERAL:  No acute distress.  Alert and oriented x3.  Cooperative on exam. 

HEENT:  Head is normocephalic and atraumatic.  Eyes; pupils are equal, round, and

reactive to light bilaterally.  Extraocular movements intact bilaterally.  Throat; no

evidence of erythema or exudates in the posterior pharynx.  She has poor dentition.  She

has an NG tube. 

NECK:  Supple.  Good range of motion. 

PULMONARY:  Clear to auscultation bilaterally.  No wheezing, rales, or rhonchi.  No

crackles appreciated. 

CARDIOVASCULAR:  Positive S1 and S2.  No murmurs, rubs, or gallops appreciated. 

ABDOMEN:  Soft, nondistended, and nontender to palpation.  Bowel sounds are present. 

MUSCULOSKELETAL:  Strength is 5/5 throughout.  No evidence of any muscle deficits on

examination.  No weakness appreciated. 

NEUROLOGIC:  Cranial nerves II through XII grossly intact.  No evidence of any

neurological deficits on exam. 

SKIN:  Intact.  Warm to touch.  Good cap refill. 

PSYCHIATRIC:  Normal affect and mood. 

EXTREMITIES:  No edema.  Good range of motion throughout.

 

LABORATORY DATA:  Labs show CBC stable. Chemistry reviewed, shows sodium 135, potassium

5, chloride 106, bicarb 22, anion gap of 12, BUN is 22, creatinine is 0.71.  CBC; white

count 4.5, hemoglobin 12, hematocrit 37, and platelets of 248. 

 

IMAGING STUDIES:  None.

 

IMPRESSION:  

1. Status post exploratory laparotomy with resection of small bowel with complete bowel

obstruction secondary to closed loop obstruction with small bowel resection, stapled

side-to-side functional end-to-end anastomosis, postop day #1. 

2. Breast cancer with metastasis.

3. Abdominal pain, nausea, and vomiting secondary to #1.

4. Recent diagnosis of atrial fibrillation.

5. Hypertension.

 

PLAN:  At this time, continue with postop care with pain control and Dilaudid PCA.  She

still has an NG tube to intermittent wall suctioning.  Still n.p.o. per General Surgery.

 IV TPN started. 

Get morning labs.  Encourage ambulation.  Lovenox for DVT prophylaxis.  Consultants;

Cardiology, General Surgery, and Hematology/Oncology. 

 

 

 

 

______________________________

MD SELWYN Valentine/MODL

D:  04/10/2020 20:34:09

T:  04/10/2020 20:52:32

Job #:  [id number]

## 2020-04-10 NOTE — NUR
Dr. LINDSEY Jacobs made aware that PCA pump was set wrong post surgery. Patient did not 
overdose. Patient is alert and not in distress. MD states to keep PCA settings according to 
how he ordered.

## 2020-04-10 NOTE — NUR
RECEIVED REPORT FROM DAY NURSE. PATIENT IS RESTING COMFORTABLY IN THE BED. BED IS IN THE 
LOWEST POSITION AND CALL LIGHT IS WITHIN REACH. NG TUBE IS SET UP TO LOW CONTINUOUS SUCTION. 
 DENIES PAIN OR DISCOMFORT AT THIS TIME. WILL CONTINUE TO MONITOR PATIENT.

## 2020-04-10 NOTE — PROGRESS NOTE
DATE:  04/10/2020  

 

SUBJECTIVE:  The patient complains of incisional pain, but the pain is under fair

control with a PCA pump.  Denies nausea or vomiting. 

 

OBJECTIVE:  VITAL SIGNS:  She is afebrile.  Vital signs stable. 

ABDOMEN:  Reveals a dry dressing.  No bowel sounds are present.  The NG tube in situ,

draining greenish non-bloody fluid with an output that has been calculated as 400 mL in

the last 24 hours.  A urinary output was adequate as of yesterday.  The Schumacher has been

discontinued today per the patient's request. 

 

LABORATORY DATA:  Laboratories reveal essentially normal electrolytes.  Potassium of 5

which is still within normal range.  The CBC today reveals a white count of 4.6 with a

hematocrit of 37, which is essentially the same as yesterday prior to the surgery.  The

platelet count is normal. 

 

ASSESSMENT:  Status post exploratory laparotomy and resection of small bowel for a

complete bowel obstruction secondary to closed loop obstruction.  Small bowel resection

with stapled side-to-side functional end-to-end anastomosis.  The patient is stable at

this point. 

 

PLAN:  Continue care as it is.  NG tube drainage.  She will be started on TPN today.

Hopefully, we can resume her oral intake relatively quickly. 

 

 

 

 

______________________________

MD MIKE Segovia/JESSIE

D:  04/10/2020 15:23:08

T:  04/10/2020 15:41:31

Job #:  [id number]

## 2020-04-10 NOTE — NUR
encourage pt to sit up on side of bed, assisted pt, put pillows behind her back and bedside 
table in front of her with call light in reach

## 2020-04-10 NOTE — PROGRESS NOTE
DATE:    

 

SUBJECTIVE:  The patient's chart reviewed.  The patient received surgical intervention,

tolerated very well, showed __________.  Supposed to start on the TPN.  The patient has

no worsening anemia.  Hemoglobin __________. 

 

Labs, imaging reviewed.

 

REVIEW OF SYSTEMS:

No change. 

 

The patient has history of multiple medical conditions to include stage IV breast cancer

__________ likely required surgical intervention. 

 

Status post laparotomy, and also small bowel resection.  Tolerated very well.

 

RECOMMENDATIONS:  Continue to follow surgery recommendation. 

 

Neutropenia, resolved. 

 

Breast cancer. 

 

Outpatient management with continuation of current hormonal treatment. 

 

We will monitor the patient very closely.

 

 

 

 

______________________________

MD CALE Davila/JESSIE

D:  04/10/2020 12:07:28

T:  04/10/2020 12:37:17

Job #:  [id number]

## 2020-04-10 NOTE — NUR
MARTINEZ REMOVED PER MD ORDER, 100ML YELLOW URINE NOTED, PT EDUCATED TO CALL FOR ASSISTANCE 
BEFORE GETTING OUT OF BED, CALL LIGHT WITHIN REACH

## 2020-04-10 NOTE — DIAGNOSTIC IMAGING REPORT
EXAM:  CHEST XRAY LINE PLACEMENT



DATE: 4/10/2020 11:32 AM  



INDICATION: PICC placement  



COMPARISON: 4/4/2020



FINDINGS:

There has been interval placement of a right-sided PICC line with catheter tip

terminating appropriately over the SVC. Enteric tube noted coursing below the

diaphragm and terminating within the left upper quadrant the expected region of

the stomach.



The trachea is midline. There are mildly increased left basilar opacities

suggestive of atelectasis. There is no evidence for large focal consolidation,

pneumothorax, or significant pleural effusion. The cardiac mediastinal

silhouette is stable in appearance. No acute osseous abnormalities identified.





IMPRESSION:



Right-sided PICC line identified in appropriate position.



Signed by: Dr. Khang Martinez MD on 4/10/2020 12:19 PM

## 2020-04-10 NOTE — NUR
Nutrition Intervention Note



RD Recommendation(s) for Physician: 

-Recommend Standard TPN through central line @ 70 mL/hr and 25 g/day lipids (252 g dextrose 
30% 500 mL, 84 g AA 10% 500 mL, and 25 gm/day lipids)  provides 1418 kcal and 84 g protein



-Advance diet when medically appropriate to GI soft

The patient meets criteria for MODERATE protein-calorie malnutrition. 





Plan of Care: RD following, monitoring for tolerance and adequacy



Nutrition reason for involvement: Length of stay and TPN



RD Assessment

(4/10/20) Pt is a 64 year old female admitted with chemotherapy induced neutropenia, SBO, 
and vomiting. Pt had an exploratory laparotomy and bowel resection yesterday. Pt has been 
receiving PPN since 4/6.  Pt is planned to have PICC line placed today and is to be started 
on TPN per chart. Pt reports she has eating 50 % to sometimes >50% of her meals prior to 
admission. Pt also mentioned she had lost weight due to cancer and used to weigh 205 lbs 1 
year ago. Pt currently has a weight of 158 lbs in chart. This would be a 23% weight loss in 
1 year  significant weight loss. Pt reports some nausea. No chewing/swallowing isuses. Will 
continue to monitor.



Principal Problems/Diagnoses: chemotherapy induced neutropenia, SBO, and vomiting



PMH: Breast cancer with metastasis to the bone, hypertension, acid reflux, neuropathy, 
recent diagnosis of atrial fibrillation.



I/O: 1100/400



GI: flat, soft, tender abdomen, last recorded BM 4/6



Skin: no pressure ulcers



Labs: (4/10/20)  Na 135, Glu 138, Ca 7.7



Meds: antibiotic, metoprolol, zofran



Ht: 65 inches

Wt: 158 lbs

BMI: 26.3 kg/m2

IBW: 125 lbs





Malnutrition Evaluation (4/10/20)

The patient meets criteria for MODERATE protein-calorie malnutrition. 



Energy intake:

<75% of estimated energy requirements for >7 days



Weight loss:

>20% in 1 year (Chronic)



Fat loss: unable to evaluate

Muscle loss: unable to evaluate



Supporting Evidence:

Fluid accumulation: no edema per MD note

Functional Status: unable to evaluate



Nutrition Prescription (Diet Order): NPO, Standard PPN @ 80 mL/hr with 25 gm lipids 3x/week 
(96 g dextrose, 10% 500 mL, 82 g AA, 8.5% 500 mL), and 25 gm lipids 3x/week)  provides 748 
kcal, 82 g protein



Estimated Nutritional Needs:

3751-3014 calories/day (18-20 kcal/kg CBW)

 g protein/day (1-1.5 g pro/kg CBW)



Diet Adequacy:

Not meeting calorie needs, meeting protein needs



Tolerance: N/A 





Diet Education Needs Assessment: Diet education not indicated, patient on 
temporary/transition diet.



Nutrition Care Level: moderate



Nutrition Diagnosis: Moderate malnutrition related to medical condition as evidenced by pt 
meeting <75% of estimated energy needs for > 7 days and >20% weight loss in 1 year.





Goal: Patient will meet % of estimated needs by follow up 



Progress: N/A



Interventions:

-Composition, Rate, Route, Recommended Modifications



Monitoring/Evaluation:

-Total energy intake, Total protein intake, Formula/Solution,, Weight change



Signed: Miesha Magallanes RD, LD

## 2020-04-11 VITALS — SYSTOLIC BLOOD PRESSURE: 172 MMHG | DIASTOLIC BLOOD PRESSURE: 84 MMHG

## 2020-04-11 VITALS — DIASTOLIC BLOOD PRESSURE: 81 MMHG | SYSTOLIC BLOOD PRESSURE: 127 MMHG

## 2020-04-11 VITALS — DIASTOLIC BLOOD PRESSURE: 70 MMHG | SYSTOLIC BLOOD PRESSURE: 158 MMHG

## 2020-04-11 VITALS — SYSTOLIC BLOOD PRESSURE: 159 MMHG | DIASTOLIC BLOOD PRESSURE: 90 MMHG

## 2020-04-11 VITALS — DIASTOLIC BLOOD PRESSURE: 83 MMHG | SYSTOLIC BLOOD PRESSURE: 133 MMHG

## 2020-04-11 VITALS — DIASTOLIC BLOOD PRESSURE: 90 MMHG | SYSTOLIC BLOOD PRESSURE: 159 MMHG

## 2020-04-11 VITALS — SYSTOLIC BLOOD PRESSURE: 178 MMHG | DIASTOLIC BLOOD PRESSURE: 94 MMHG

## 2020-04-11 VITALS — DIASTOLIC BLOOD PRESSURE: 84 MMHG | SYSTOLIC BLOOD PRESSURE: 172 MMHG

## 2020-04-11 LAB
ANION GAP SERPL CALC-SCNC: 9.8 MMOL/L (ref 8–16)
BASOPHILS # BLD AUTO: 0.1 10*3/UL (ref 0–0.1)
BASOPHILS NFR BLD AUTO: 0.9 % (ref 0–1)
BUN SERPL-MCNC: 15 MG/DL (ref 7–26)
BUN/CREAT SERPL: 27 (ref 6–25)
CALCIUM SERPL-MCNC: 8.1 MG/DL (ref 8.4–10.2)
CHLORIDE SERPL-SCNC: 105 MMOL/L (ref 98–107)
CO2 SERPL-SCNC: 23 MMOL/L (ref 22–29)
DEPRECATED NEUTROPHILS # BLD AUTO: 4.4 10*3/UL (ref 2.1–6.9)
EGFRCR SERPLBLD CKD-EPI 2021: > 60 ML/MIN (ref 60–?)
EOSINOPHIL # BLD AUTO: 0 10*3/UL (ref 0–0.4)
EOSINOPHIL NFR BLD AUTO: 0.5 % (ref 0–6)
ERYTHROCYTE [DISTWIDTH] IN CORD BLOOD: 15.8 % (ref 11.7–14.4)
GLUCOSE SERPLBLD-MCNC: 164 MG/DL (ref 74–118)
HCT VFR BLD AUTO: 32 % (ref 34.2–44.1)
HGB BLD-MCNC: 10.6 G/DL (ref 12–16)
LYMPHOCYTES # BLD: 0.4 10*3/UL (ref 1–3.2)
LYMPHOCYTES NFR BLD AUTO: 7.5 % (ref 18–39.1)
LYMPHOCYTES NFR BLD MANUAL: 6 % (ref 19–48)
MCH RBC QN AUTO: 31.1 PG (ref 28–32)
MCHC RBC AUTO-ENTMCNC: 33.1 G/DL (ref 31–35)
MCV RBC AUTO: 93.8 FL (ref 81–99)
MONOCYTES # BLD AUTO: 0.6 10*3/UL (ref 0.2–0.8)
MONOCYTES NFR BLD AUTO: 10.6 % (ref 4.4–11.3)
MONOCYTES NFR BLD MANUAL: 8 % (ref 3.4–9)
NEUTS BAND NFR BLD MANUAL: 4 %
NEUTS SEG NFR BLD AUTO: 74.5 % (ref 38.7–80)
NEUTS SEG NFR BLD MANUAL: 82 % (ref 40–74)
PLATELET # BLD AUTO: 216 X10E3/UL (ref 140–360)
POTASSIUM SERPL-SCNC: 3.8 MMOL/L (ref 3.5–5.1)
RBC # BLD AUTO: 3.41 X10E6/UL (ref 3.6–5.1)
SODIUM SERPL-SCNC: 134 MMOL/L (ref 136–145)

## 2020-04-11 RX ADMIN — HYDROMORPHONE HYDROCHLORIDE PRN MG: 1 INJECTION, SOLUTION INTRAMUSCULAR; INTRAVENOUS; SUBCUTANEOUS at 20:53

## 2020-04-11 RX ADMIN — SODIUM CHLORIDE SCH ML: 900 IRRIGANT IRRIGATION at 16:42

## 2020-04-11 RX ADMIN — METOPROLOL TARTRATE PRN MG: 1 INJECTION, SOLUTION INTRAVENOUS at 12:19

## 2020-04-11 RX ADMIN — TAZOBACTAM SODIUM AND PIPERACILLIN SODIUM SCH GM: 375; 3 INJECTION, SOLUTION INTRAVENOUS at 20:52

## 2020-04-11 RX ADMIN — SODIUM CHLORIDE SCH ML: 900 IRRIGANT IRRIGATION at 10:45

## 2020-04-11 RX ADMIN — SODIUM CHLORIDE PRN MG: 900 INJECTION INTRAVENOUS at 20:53

## 2020-04-11 RX ADMIN — SODIUM CHLORIDE SCH ML: 900 IRRIGANT IRRIGATION at 06:34

## 2020-04-11 RX ADMIN — SODIUM CHLORIDE SCH ML: 900 IRRIGANT IRRIGATION at 14:45

## 2020-04-11 RX ADMIN — TAZOBACTAM SODIUM AND PIPERACILLIN SODIUM SCH GM: 375; 3 INJECTION, SOLUTION INTRAVENOUS at 05:22

## 2020-04-11 RX ADMIN — SODIUM CHLORIDE SCH ML: 900 IRRIGANT IRRIGATION at 22:45

## 2020-04-11 RX ADMIN — TAZOBACTAM SODIUM AND PIPERACILLIN SODIUM SCH GM: 375; 3 INJECTION, SOLUTION INTRAVENOUS at 14:38

## 2020-04-11 RX ADMIN — SODIUM CHLORIDE SCH ML: 900 IRRIGANT IRRIGATION at 02:45

## 2020-04-11 NOTE — PROGRESS NOTE
DATE:  04/11/2020

 

Medicine Progress Note 

 

SUBJECTIVE:  The patient reports still having pain, but she is not hitting the PCA pump.

 Apparently, she is only hitting the pump once an hour.  She has 0.2 mg every 10

minutes, but she is not taking that at all.  I educated the patient about hitting the

button for pain control. 

 

PHYSICAL EXAMINATION:

VITAL SIGNS:  Temperature is 98.6, pulse is 121, respiratory rate is 20, blood pressure

178/94, pulse ox 93% on room air. 

GENERAL:  No acute distress.  Alert and oriented x3.  Cooperative on exam. 

HEENT:  Head is normocephalic and atraumatic.  Eyes; pupils are equal, round, and

reactive to light bilaterally.  Extraocular movements intact bilaterally.  Throat; no

evidence of erythema or exudates in the posterior pharynx.  She has poor dentition.  She

has an NG tube. 

NECK:  Supple.  Good range of motion. 

PULMONARY:  Clear to auscultation bilaterally.  No wheezing, rales, or rhonchi.  No

crackles appreciated. 

CARDIOVASCULAR:  Positive S1 and S2.  No murmurs, rubs, or gallops appreciated. 

ABDOMEN:  Soft, nondistended, and nontender to palpation.  Bowel sounds are present. 

MUSCULOSKELETAL:  Strength is 5/5 throughout.  No evidence of any muscle deficits on

examination.  No weakness appreciated. 

NEUROLOGIC:  Cranial nerves II through XII grossly intact.  No evidence of any

neurological deficits on exam. 

SKIN:  Intact.  Warm to touch.  Good cap refill. 

PSYCHIATRIC:  Normal affect and mood. 

EXTREMITIES:  No edema.  Good range of motion throughout.

LABORATORY DATA:  White count 5.8, hemoglobin 10.6, hematocrit 32, platelets of 216.

Chemistry:  Sodium 134, potassium 3.8, chloride 105, bicarb 23, anion gap of 9.8, BUN is

15, creatinine is 0.56, and glucose is 164, calcium was 8.1. 

 

MICROBIOLOGY:  None.

 

IMAGING STUDIES:  None.

 

IMPRESSION:  

1. Status post exploratory laparotomy with resection of small bowel with complete bowel

obstruction secondary to closed loop obstruction with small bowel resection, stapled

side-to-side functional end-to-end anastomosis, postop day #2. 

2. Breast cancer with metastasis.

3. Abdominal pain, nausea, vomiting secondary to #1.

4. Recent diagnosis of atrial fibrillation.

5. Hypertension.

 

PLAN:  At this time, the patient is not using a PCA pump as directed. I educated her if

she has significant pain, she needs to continue to hit the button.  She is apparently

only hitting the button once an hour.  She will continue with NG tube to intermittent

wall suctioning.  She is on IV 

TPN.  Continue with Lovenox for DVT prophylaxis.  Consultants involved Cardiology,

General surgery, Hematology Oncology. 

 

 

 

 

______________________________

MD SELWYN Valentine/JESSIE

D:  04/11/2020 14:20:01

T:  04/11/2020 17:01:11

Job #:  [id number]

## 2020-04-11 NOTE — NUR
received report from day nurse. patient id resting comfortably in bed. bed is in lowest 
position and call light is within reach. Denies pain or discomfort. will continue to monitor 
patient.

## 2020-04-11 NOTE — NUR
REPORT GIVEN TO DAY NURSE. PATIENT IS RESTING COMFORTABLY IN THE BED. BED IS IN THE LOWEST 
POSITION AND CALL LIGHT IS WITHIN REACH.

## 2020-04-11 NOTE — PROGRESS NOTE
DATE:    

 

SUBJECTIVE:  The patient seen and examined today.  The patient doing well

postoperatively.  Clinical condition is improving.  Currently on TPN and also pain

medication. 

 

PHYSICAL EXAMINATION:

VITAL SIGNS:  Reviewed. 

GENERAL:  Alert, awake, and communicative. 

HEENT:  Normocephalic, atraumatic.  Sclerae pale.  Conjunctivae clear. 

NECK:  Supple. 

CHEST:  Clear to auscultation.  No wheezing. 

CARDIOVASCULAR:  Regular rate and rhythm. 

ABDOMEN:  Soft and nontender. 

MUSCULOSKELETAL:  Normal inspection. 

CNS:  Grossly intact. 

SKIN:  Intact. 

EXTREMITIES:  No edema.

LABS AND IMAGING:  Hemoglobin was 12.1, white cell count was 4.5, platelet were 248.

 

ASSESSMENT AND PLAN:  The patient with a history of a small bowel obstruction secondary

to closed loop obstruction with small-bowel resection, with stapled side-to-side

functional end-to-end anastomosis.  Doing okay.  Clinical condition is stable.

Recommendation to follow surgical recommendation. 

 

Metastatic breast cancer, currently treated with hormonal treatment include Faslodex and

Ibrance.  Treatment on hold.  Further care as outpatient. 

 

Neutropenia, admitted with neutropenia, required Neupogen 1 treatment, sounds improved. 

 

We will continue remaining care.  Monitor the patient very closely.

 

 

 

 

______________________________

MD CALE Davila/JESSIE

D:  04/11/2020 08:06:50

T:  04/11/2020 08:30:21

Job #:  [id number]

## 2020-04-11 NOTE — NUR
REPORT RECIEVED, WALKING ROUNDS PERFORMED, PCA PUMP CLEARED AND CHECKED, PT RESTING QUIETLY 
IN BED, CALL LIGHT WITHIN REACH

## 2020-04-12 VITALS — SYSTOLIC BLOOD PRESSURE: 114 MMHG | DIASTOLIC BLOOD PRESSURE: 57 MMHG

## 2020-04-12 VITALS — SYSTOLIC BLOOD PRESSURE: 124 MMHG | DIASTOLIC BLOOD PRESSURE: 60 MMHG

## 2020-04-12 VITALS — SYSTOLIC BLOOD PRESSURE: 132 MMHG | DIASTOLIC BLOOD PRESSURE: 75 MMHG

## 2020-04-12 VITALS — SYSTOLIC BLOOD PRESSURE: 109 MMHG | DIASTOLIC BLOOD PRESSURE: 71 MMHG

## 2020-04-12 VITALS — DIASTOLIC BLOOD PRESSURE: 59 MMHG | SYSTOLIC BLOOD PRESSURE: 144 MMHG

## 2020-04-12 VITALS — SYSTOLIC BLOOD PRESSURE: 116 MMHG | DIASTOLIC BLOOD PRESSURE: 68 MMHG

## 2020-04-12 VITALS — DIASTOLIC BLOOD PRESSURE: 68 MMHG | SYSTOLIC BLOOD PRESSURE: 115 MMHG

## 2020-04-12 LAB
ANION GAP SERPL CALC-SCNC: 11.5 MMOL/L (ref 8–16)
BASOPHILS # BLD AUTO: 0 10*3/UL (ref 0–0.1)
BASOPHILS NFR BLD AUTO: 0.7 % (ref 0–1)
BUN SERPL-MCNC: 18 MG/DL (ref 7–26)
BUN/CREAT SERPL: 32 (ref 6–25)
CALCIUM SERPL-MCNC: 7.9 MG/DL (ref 8.4–10.2)
CHLORIDE SERPL-SCNC: 102 MMOL/L (ref 98–107)
CO2 SERPL-SCNC: 28 MMOL/L (ref 22–29)
DEPRECATED NEUTROPHILS # BLD AUTO: 4.3 10*3/UL (ref 2.1–6.9)
EGFRCR SERPLBLD CKD-EPI 2021: > 60 ML/MIN (ref 60–?)
EOSINOPHIL # BLD AUTO: 0.1 10*3/UL (ref 0–0.4)
EOSINOPHIL NFR BLD AUTO: 0.9 % (ref 0–6)
ERYTHROCYTE [DISTWIDTH] IN CORD BLOOD: 15.7 % (ref 11.7–14.4)
GLUCOSE SERPLBLD-MCNC: 148 MG/DL (ref 74–118)
HCT VFR BLD AUTO: 30.7 % (ref 34.2–44.1)
HGB BLD-MCNC: 9.9 G/DL (ref 12–16)
LYMPHOCYTES # BLD: 0.5 10*3/UL (ref 1–3.2)
LYMPHOCYTES NFR BLD AUTO: 8.1 % (ref 18–39.1)
MCH RBC QN AUTO: 30.8 PG (ref 28–32)
MCHC RBC AUTO-ENTMCNC: 32.2 G/DL (ref 31–35)
MCV RBC AUTO: 95.6 FL (ref 81–99)
MONOCYTES # BLD AUTO: 0.5 10*3/UL (ref 0.2–0.8)
MONOCYTES NFR BLD AUTO: 9.3 % (ref 4.4–11.3)
NEUTS SEG NFR BLD AUTO: 75.5 % (ref 38.7–80)
PLATELET # BLD AUTO: 237 X10E3/UL (ref 140–360)
POTASSIUM SERPL-SCNC: 3.5 MMOL/L (ref 3.5–5.1)
RBC # BLD AUTO: 3.21 X10E6/UL (ref 3.6–5.1)
SODIUM SERPL-SCNC: 138 MMOL/L (ref 136–145)

## 2020-04-12 RX ADMIN — SODIUM CHLORIDE SCH ML: 900 IRRIGANT IRRIGATION at 14:45

## 2020-04-12 RX ADMIN — SODIUM CHLORIDE SCH ML: 900 IRRIGANT IRRIGATION at 10:45

## 2020-04-12 RX ADMIN — TAZOBACTAM SODIUM AND PIPERACILLIN SODIUM SCH GM: 375; 3 INJECTION, SOLUTION INTRAVENOUS at 22:01

## 2020-04-12 RX ADMIN — SODIUM CHLORIDE SCH ML: 900 IRRIGANT IRRIGATION at 06:09

## 2020-04-12 RX ADMIN — TAZOBACTAM SODIUM AND PIPERACILLIN SODIUM SCH GM: 375; 3 INJECTION, SOLUTION INTRAVENOUS at 06:08

## 2020-04-12 RX ADMIN — SODIUM CHLORIDE PRN MG: 900 INJECTION INTRAVENOUS at 08:20

## 2020-04-12 RX ADMIN — HYDROMORPHONE HYDROCHLORIDE PRN MG: 1 INJECTION, SOLUTION INTRAMUSCULAR; INTRAVENOUS; SUBCUTANEOUS at 13:50

## 2020-04-12 RX ADMIN — BISACODYL SCH MG: 10 SUPPOSITORY RECTAL at 12:45

## 2020-04-12 RX ADMIN — HYDROMORPHONE HYDROCHLORIDE PRN MG: 1 INJECTION, SOLUTION INTRAMUSCULAR; INTRAVENOUS; SUBCUTANEOUS at 22:15

## 2020-04-12 RX ADMIN — BISACODYL SCH MG: 10 SUPPOSITORY RECTAL at 22:01

## 2020-04-12 RX ADMIN — SODIUM CHLORIDE PRN MG: 900 INJECTION INTRAVENOUS at 13:50

## 2020-04-12 RX ADMIN — SODIUM CHLORIDE SCH ML: 900 IRRIGANT IRRIGATION at 06:08

## 2020-04-12 RX ADMIN — HYDROMORPHONE HYDROCHLORIDE PRN MG: 1 INJECTION, SOLUTION INTRAMUSCULAR; INTRAVENOUS; SUBCUTANEOUS at 08:20

## 2020-04-12 RX ADMIN — HYDROMORPHONE HYDROCHLORIDE PRN MG: 1 INJECTION, SOLUTION INTRAMUSCULAR; INTRAVENOUS; SUBCUTANEOUS at 19:15

## 2020-04-12 RX ADMIN — TAZOBACTAM SODIUM AND PIPERACILLIN SODIUM SCH GM: 375; 3 INJECTION, SOLUTION INTRAVENOUS at 14:15

## 2020-04-12 RX ADMIN — BISACODYL SCH MG: 10 SUPPOSITORY RECTAL at 21:00

## 2020-04-12 RX ADMIN — SODIUM CHLORIDE SCH ML: 900 IRRIGANT IRRIGATION at 22:33

## 2020-04-12 RX ADMIN — PROMETHAZINE HYDROCHLORIDE PRN MG: 25 INJECTION, SOLUTION INTRAMUSCULAR; INTRAVENOUS at 22:33

## 2020-04-12 RX ADMIN — SODIUM CHLORIDE PRN MG: 900 INJECTION INTRAVENOUS at 19:15

## 2020-04-12 RX ADMIN — SODIUM CHLORIDE SCH ML: 900 IRRIGANT IRRIGATION at 18:45

## 2020-04-12 RX ADMIN — METOPROLOL TARTRATE PRN MG: 1 INJECTION, SOLUTION INTRAVENOUS at 06:09

## 2020-04-12 RX ADMIN — SODIUM CHLORIDE PRN MG: 900 INJECTION INTRAVENOUS at 00:57

## 2020-04-12 RX ADMIN — HYDROMORPHONE HYDROCHLORIDE PRN MG: 1 INJECTION, SOLUTION INTRAMUSCULAR; INTRAVENOUS; SUBCUTANEOUS at 00:58

## 2020-04-12 NOTE — NUR
patient is resting in the bed, bed is in the lowest position and call light is within reach. 
No signs of pain or discomfort noted.

## 2020-04-12 NOTE — PROGRESS NOTE
DATE:  04/12/2020

 

Medicine Progress Note 

 

SUBJECTIVE:  The patient continues to have significant amount of output from her NG

tube.  During my evaluation, she had approximately 1 L output since this morning.  No

overnight events.  She is still on tube feeds. 

 

PHYSICAL EXAMINATION:

VITAL SIGNS:  Temperature is 96.6, pulse is 103, respiratory rate is 18, blood pressure

__________, and pulse ox 92% on room air. 

GENERAL:  Not in acute distress.  Alert and oriented x3.  Cooperative on examination. 

HEENT:  Head is normocephalic and atraumatic.  Eyes; pupils are equal, round, and

reactive to light bilaterally.  Extraocular movements intact bilaterally.  Throat; no

evidence of erythema or exudates in the posterior pharynx.  Has poor dentition. 

NECK:  Supple.  Good range of motion. 

PULMONARY:  Clear to auscultation bilaterally.  No wheezing, rales, or rhonchi.  No

crackles appreciated. 

CARDIOVASCULAR:  Positive S1, S2.  No murmurs, rubs, or gallops appreciated. 

ABDOMEN:  Soft, nondistended, and nontender to palpation.  Bowel sounds present. 

MUSCULOSKELETAL:  Strength is 5/5 throughout.  No evidence of any muscle deficits on

examination.  No weakness appreciated. 

NEUROLOGIC:  Cranial nerves II through XII grossly intact.  No evidence of any

neurological deficits on exam. 

SKIN:  Intact.  Warm to touch.  Good cap refill. 

PSYCHIATRIC:  Normal affect and mood. 

EXTREMITIES:  No edema.  Good range of motion throughout.

LABORATORY DATA:  Labs show white count 5.6, hemoglobin 9.9, hematocrit 31, and

platelets of 237.  Chemistry; sodium 138, potassium 3.5, chloride 102, bicarb 28, anion

gap of 11, BUN is 18, creatinine is 0.56, and glucose is 148, calcium is 7.9. 

 

MICROBIOLOGY:  None.

 

IMAGING STUDIES:  None.

 

IMPRESSION:  

1. Status post exploratory laparotomy with resection of the small bowel with complete

bowel obstruction, secondary to closed loop obstruction with small-bowel resection,

stapled side-to-side functional end-to-end anastomosis, postoperative day #3. 

2. Breast cancer with metastasis.

3. Abdominal pain, nausea, and vomiting secondary to #1.

4. Recent diagnosis of atrial fibrillation.

5. Hypertension.

 

PLAN:  At this time, the PCA has been discontinued.  She is on IV pushes of pain

control.  She still has significant amount of NG output ,approximately 1 L during my

evaluation.  She is still on IV TPN, which we will continue with the same standard TPN.

Discussed with nursing staff. 

She is on subcutaneous Lovenox for DVT prophylaxis.  Consultants involved Cardiology,

General Surgery, Hematology/Oncology.  We will get a.m. labs.  Monitor very closely. 

 

 

 

 

______________________________

MD SELWYN Valentine/JESSIE

D:  04/12/2020 14:35:54

T:  04/12/2020 17:45:06

Job #:  [id number]

## 2020-04-12 NOTE — NUR
WITH STANDBY ASSIST, PT OOB TO BATHROOM, VOIDING WITHOUT DIFFICULTY, PT REPORTS PASSING 
FLATUS, STANDBY ASSIST BACK TO BED, PT REQUESTING TO NOW GET UP TO BRUSH TEETH, STANDBY 
ASSIST BACK TO BATHROOM, PT BRUSHED TEETH, STANDBY ASSIST BACK TO BED, CALL LIGHT WITHIN 
REACH, MEDICATED PER MD ORDER FOR ABDOMINAL PAIN 8/10, EDUCATED TO NOT GET OOB WITHOUT 
CALLING FOR ASSISTANCE, PT VERBALIZED UNDERSTANDING, CALL LIGHT WITHIN REACH

## 2020-04-12 NOTE — NUR
received report from day nurse. patient is resting comfortably in the bed. bed is in lowest 
position and call light is within reach. will continue to monitor patient.

## 2020-04-13 VITALS — SYSTOLIC BLOOD PRESSURE: 112 MMHG | DIASTOLIC BLOOD PRESSURE: 84 MMHG

## 2020-04-13 VITALS — SYSTOLIC BLOOD PRESSURE: 104 MMHG | DIASTOLIC BLOOD PRESSURE: 68 MMHG

## 2020-04-13 VITALS — DIASTOLIC BLOOD PRESSURE: 91 MMHG | SYSTOLIC BLOOD PRESSURE: 172 MMHG

## 2020-04-13 VITALS — SYSTOLIC BLOOD PRESSURE: 114 MMHG | DIASTOLIC BLOOD PRESSURE: 76 MMHG

## 2020-04-13 VITALS — DIASTOLIC BLOOD PRESSURE: 69 MMHG | SYSTOLIC BLOOD PRESSURE: 113 MMHG

## 2020-04-13 VITALS — DIASTOLIC BLOOD PRESSURE: 68 MMHG | SYSTOLIC BLOOD PRESSURE: 104 MMHG

## 2020-04-13 LAB
ANION GAP SERPL CALC-SCNC: 13.4 MMOL/L (ref 8–16)
ANISOCYTOSIS BLD QL SMEAR: SLIGHT
BASOPHILS # BLD AUTO: 0.1 10*3/UL (ref 0–0.1)
BASOPHILS NFR BLD AUTO: 0.8 % (ref 0–1)
BUN SERPL-MCNC: 25 MG/DL (ref 7–26)
BUN/CREAT SERPL: 36 (ref 6–25)
CALCIUM SERPL-MCNC: 8.1 MG/DL (ref 8.4–10.2)
CHLORIDE SERPL-SCNC: 96 MMOL/L (ref 98–107)
CO2 SERPL-SCNC: 37 MMOL/L (ref 22–29)
DEPRECATED NEUTROPHILS # BLD AUTO: 5.9 10*3/UL (ref 2.1–6.9)
EGFRCR SERPLBLD CKD-EPI 2021: > 60 ML/MIN (ref 60–?)
EOSINOPHIL # BLD AUTO: 0.1 10*3/UL (ref 0–0.4)
EOSINOPHIL NFR BLD AUTO: 0.7 % (ref 0–6)
EOSINOPHIL NFR BLD MANUAL: 2 % (ref 0–7)
ERYTHROCYTE [DISTWIDTH] IN CORD BLOOD: 15.9 % (ref 11.7–14.4)
GLUCOSE SERPLBLD-MCNC: 156 MG/DL (ref 74–118)
HCT VFR BLD AUTO: 33.3 % (ref 34.2–44.1)
HGB BLD-MCNC: 10.8 G/DL (ref 12–16)
HYPOCHROMIA BLD QL SMEAR: SLIGHT
LYMPHOCYTES # BLD: 0.5 10*3/UL (ref 1–3.2)
LYMPHOCYTES NFR BLD AUTO: 6.5 % (ref 18–39.1)
LYMPHOCYTES NFR BLD MANUAL: 4 % (ref 19–48)
MCH RBC QN AUTO: 30.9 PG (ref 28–32)
MCHC RBC AUTO-ENTMCNC: 32.4 G/DL (ref 31–35)
MCV RBC AUTO: 95.4 FL (ref 81–99)
MONOCYTES # BLD AUTO: 0.6 10*3/UL (ref 0.2–0.8)
MONOCYTES NFR BLD AUTO: 7.9 % (ref 4.4–11.3)
MONOCYTES NFR BLD MANUAL: 5 % (ref 3.4–9)
NEUTS SEG NFR BLD AUTO: 80.8 % (ref 38.7–80)
NEUTS SEG NFR BLD MANUAL: 89 % (ref 40–74)
PLAT MORPH BLD: (no result)
PLATELET # BLD AUTO: 315 X10E3/UL (ref 140–360)
PLATELET # BLD EST: ADEQUATE 10*3/UL
POTASSIUM SERPL-SCNC: 3.4 MMOL/L (ref 3.5–5.1)
RBC # BLD AUTO: 3.49 X10E6/UL (ref 3.6–5.1)
RBC MORPH BLD: NORMAL
SODIUM SERPL-SCNC: 143 MMOL/L (ref 136–145)

## 2020-04-13 RX ADMIN — HYDROMORPHONE HYDROCHLORIDE PRN MG: 1 INJECTION, SOLUTION INTRAMUSCULAR; INTRAVENOUS; SUBCUTANEOUS at 21:26

## 2020-04-13 RX ADMIN — BISACODYL SCH MG: 10 SUPPOSITORY RECTAL at 20:19

## 2020-04-13 RX ADMIN — SODIUM CHLORIDE PRN MG: 900 INJECTION INTRAVENOUS at 03:37

## 2020-04-13 RX ADMIN — TAZOBACTAM SODIUM AND PIPERACILLIN SODIUM SCH GM: 375; 3 INJECTION, SOLUTION INTRAVENOUS at 21:26

## 2020-04-13 RX ADMIN — ENOXAPARIN SODIUM SCH MG: 30 INJECTION SUBCUTANEOUS at 11:05

## 2020-04-13 RX ADMIN — TAZOBACTAM SODIUM AND PIPERACILLIN SODIUM SCH GM: 375; 3 INJECTION, SOLUTION INTRAVENOUS at 14:45

## 2020-04-13 RX ADMIN — HYDROMORPHONE HYDROCHLORIDE PRN MG: 1 INJECTION, SOLUTION INTRAMUSCULAR; INTRAVENOUS; SUBCUTANEOUS at 11:05

## 2020-04-13 RX ADMIN — SODIUM CHLORIDE SCH ML: 900 IRRIGANT IRRIGATION at 02:45

## 2020-04-13 RX ADMIN — METOCLOPRAMIDE SCH MG: 5 INJECTION, SOLUTION INTRAMUSCULAR; INTRAVENOUS at 17:26

## 2020-04-13 RX ADMIN — SODIUM CHLORIDE SCH ML: 900 IRRIGANT IRRIGATION at 06:39

## 2020-04-13 RX ADMIN — BISACODYL SCH MG: 10 SUPPOSITORY RECTAL at 08:47

## 2020-04-13 RX ADMIN — SODIUM CHLORIDE PRN MG: 900 INJECTION INTRAVENOUS at 11:05

## 2020-04-13 RX ADMIN — SODIUM CHLORIDE SCH MLS/HR: 9 INJECTION, SOLUTION INTRAVENOUS at 20:33

## 2020-04-13 RX ADMIN — PROMETHAZINE HYDROCHLORIDE PRN MG: 25 INJECTION, SOLUTION INTRAMUSCULAR; INTRAVENOUS at 06:39

## 2020-04-13 RX ADMIN — HYDROMORPHONE HYDROCHLORIDE PRN MG: 1 INJECTION, SOLUTION INTRAMUSCULAR; INTRAVENOUS; SUBCUTANEOUS at 03:37

## 2020-04-13 RX ADMIN — SODIUM CHLORIDE SCH ML: 900 IRRIGANT IRRIGATION at 21:26

## 2020-04-13 RX ADMIN — METOCLOPRAMIDE SCH MG: 5 INJECTION, SOLUTION INTRAMUSCULAR; INTRAVENOUS at 12:26

## 2020-04-13 RX ADMIN — TAZOBACTAM SODIUM AND PIPERACILLIN SODIUM SCH GM: 375; 3 INJECTION, SOLUTION INTRAVENOUS at 05:06

## 2020-04-13 RX ADMIN — SODIUM CHLORIDE SCH ML: 900 IRRIGANT IRRIGATION at 18:00

## 2020-04-13 RX ADMIN — HYDROMORPHONE HYDROCHLORIDE PRN MG: 1 INJECTION, SOLUTION INTRAMUSCULAR; INTRAVENOUS; SUBCUTANEOUS at 06:39

## 2020-04-13 RX ADMIN — HYDROMORPHONE HYDROCHLORIDE PRN MG: 1 INJECTION, SOLUTION INTRAMUSCULAR; INTRAVENOUS; SUBCUTANEOUS at 16:48

## 2020-04-13 RX ADMIN — SODIUM CHLORIDE PRN MG: 900 INJECTION INTRAVENOUS at 16:48

## 2020-04-13 RX ADMIN — SODIUM CHLORIDE SCH ML: 900 IRRIGANT IRRIGATION at 11:05

## 2020-04-13 RX ADMIN — SODIUM CHLORIDE SCH ML: 900 IRRIGANT IRRIGATION at 15:35

## 2020-04-13 NOTE — PROGRESS NOTE
DATE:    

 

SUBJECTIVE:  The patient seen examined today.  The patient appears lethargic.

Persistent significant amount from her NG tube.  No fever or chills reported. 

 

OBJECTIVE:  GENERAL:  Alert, awake, communicative. 

HEENT:  Normocephalic, atraumatic.  Sclerae pale.  Conjunctiva clear. 

NECK:  Supple. 

CHEST:  Decreased breath sounds in the bases. 

ABDOMEN:  Soft, slightly tender. 

EXTREMITIES:  No edema.

 

LABORATORY DATA:  Recent lab shows white cell count 7, hemoglobin 10, and platelet count

315.  BUN 25 and creatinine 0.69. 

 

ASSESSMENT:  The patient with a history of advanced breast cancer, currently in the

hospital with small bowel obstruction, status post surgery, currently in recovery phase,

persistent NG output.  She is following surgeon very closely.  Count is stable.

Currently on pain medication.  Recommendation to continue current TPN.  We will monitor

the patient very closely. 

 

Breast cancer with metastasis.  She was on a hormonal treatment to include Faslodex and

Ibrance. 

 

Recommendation for outpatient management for breast cancer. 

 

Leukopenia.  Required Neupogen x1 dose. 

 

Count is stable. 

 

Continue current care.  Continue to follow the patient very closely.

 

 

 

 

______________________________

MD CALE Davila/MODL

D:  04/13/2020 07:52:48

T:  04/13/2020 08:08:32

Job #:  [id number]

## 2020-04-13 NOTE — NUR
RECEIVED REPORT FROM NIGHT SHIFT NURSE, PATIENT AWAKE IN BED, NO DISTRESS NOTED. BED IS LOW 
AND LOCKED, SIDE RAILS UPX2, CALL LIGHT WITHIN REACH.

## 2020-04-13 NOTE — PROGRESS NOTE
DATE:  04/13/2020

 

Medicine Progress Note 

 

SUBJECTIVE:  The patient is doing well today with no complaints.  She still has an NG

tube to intermittent wall suctioning.  Still has some output.  She is still on IV TPN.

She is having some positive flatus. 

 

PHYSICAL EXAMINATION:

VITAL SIGNS:  Temperature is 97.9, pulse is 120, respiratory rate is 18, blood pressure

114/76, and pulse ox 93% on 3 L nasal cannula. 

GENERAL:  Not in acute distress.  Alert and oriented x3.  Cooperative on examination. 

HEENT:  Head; normocephalic, atraumatic.  Eyes; pupils are equal, round, and reactive to

light bilaterally.  Extraocular movements intact bilaterally.  Throat; no evidence of

erythema or exudates in the posterior pharynx.  Has poor dentition. 

NECK:  Supple.  Good range of motion. 

PULMONARY:  Clear to auscultation bilaterally.  No wheezing, no rales, no rhonchi, no

crackles appreciated. 

CARDIOVASCULAR:  Positive S1 and S2.  No murmurs, rubs, or gallops appreciated. 

ABDOMEN:  Soft, nondistended, and nontender to palpation.  Bowel sounds present. 

MUSCULOSKELETAL:  No evidence of any muscle deficits on examination.  No weakness

appreciated. 

NEUROLOGIC:  Cranial nerves 2 through 12 grossly intact.  No evidence of any

neurological deficits on exam. 

SKIN:  Intact.  Warm to touch.  Good cap refill. 

PSYCHIATRIC:  Normal affect and mood. 

EXTREMITIES:  No edema.  Good range of motion throughout.

LABORATORY FINDINGS:  Show white count 7.2, hemoglobin 10.8, hematocrit is 33, and

platelets of 315.  Chemistry; sodium 143, potassium 3.4, chloride 96, bicarb 37, anion

gap of 13, BUN is 25, creatinine is 0.69, glucose is 156, and calcium 8.1. 

 

DIAGNOSTIC STUDIES:  Abdominal x-ray this morning, mildly dilated loops of small bowel

in the left mid abdomen may represent ileus or postoperative standing. 

 

IMPRESSION:  

1. Status post exploratory laparotomy resection of small bowel with complete bowel

obstruction secondary to closed loop obstruction with small bowel resection, stapled

side-to-side functional end-to-end anastomosis postop day #4. 

2. Breast cancer with metastasis.

3. Abdominal pain, nausea, and vomiting secondary to #1.

4. Recent diagnosis of atrial fibrillation.

5. Hypertension.

 

PLAN:  At this time, she is currently on pain control with IV pushes.  Reports her pain

is better controlled now.  She is having some NG output, but has decreased significantly

today.  Continue with IV TPN.  Tomorrow, I need to adjust the TPN, as her bicarb is

elevated today.  Potassium is 

replaced.  She is on Lovenox for DVT prophylaxis.  Consultants; Cardiology, General

Surgery, and Hematology/Oncology are following. 

 

 

 

 

______________________________

MD SELWYN Valentine/JESSIE

D:  04/13/2020 21:50:27

T:  04/13/2020 22:30:50

Job #:  [id number]

## 2020-04-13 NOTE — DIAGNOSTIC IMAGING REPORT
EXAMINATION:  ABDOMEN 2 VIEW    



INDICATION: Ileus



COMPARISON: CT abdomen and pelvis of 4/8/2020

     

FINDINGS:



Mildly dilated loops of air-filled small bowel in the left abdomen measure up

to 4 cm maximum diameter. No free air. Residual contrast material in the large

bowel. Surgical clips overlie the midline pelvis. Status post vertebral

augmentation and lumbar fusion. Phleboliths in the pelvis. Degenerative changes

of the visualized spine.



IMPRESSION: 

Mildly dilated loops of small bowel in the left mid abdomen may represent ileus

in the postoperative setting.



Signed by: Nader Arellano MD on 4/13/2020 12:40 PM

## 2020-04-14 VITALS — DIASTOLIC BLOOD PRESSURE: 63 MMHG | SYSTOLIC BLOOD PRESSURE: 120 MMHG

## 2020-04-14 VITALS — SYSTOLIC BLOOD PRESSURE: 102 MMHG | DIASTOLIC BLOOD PRESSURE: 48 MMHG

## 2020-04-14 VITALS — SYSTOLIC BLOOD PRESSURE: 110 MMHG | DIASTOLIC BLOOD PRESSURE: 60 MMHG

## 2020-04-14 VITALS — DIASTOLIC BLOOD PRESSURE: 63 MMHG | SYSTOLIC BLOOD PRESSURE: 121 MMHG

## 2020-04-14 VITALS — DIASTOLIC BLOOD PRESSURE: 65 MMHG | SYSTOLIC BLOOD PRESSURE: 110 MMHG

## 2020-04-14 VITALS — DIASTOLIC BLOOD PRESSURE: 62 MMHG | SYSTOLIC BLOOD PRESSURE: 117 MMHG

## 2020-04-14 LAB
ALBUMIN SERPL-MCNC: 2.8 G/DL (ref 3.5–5)
ALBUMIN/GLOB SERPL: 0.6 {RATIO} (ref 0.8–2)
ALP SERPL-CCNC: 303 IU/L (ref 40–150)
ALT SERPL-CCNC: 55 IU/L (ref 0–55)
ANION GAP SERPL CALC-SCNC: 16.7 MMOL/L (ref 8–16)
ANION GAP SERPL CALC-SCNC: 9.8 MMOL/L (ref 8–16)
BASOPHILS # BLD AUTO: 0.1 10*3/UL (ref 0–0.1)
BASOPHILS NFR BLD AUTO: 0.8 % (ref 0–1)
BUN SERPL-MCNC: 30 MG/DL (ref 7–26)
BUN SERPL-MCNC: 34 MG/DL (ref 7–26)
BUN/CREAT SERPL: 42 (ref 6–25)
BUN/CREAT SERPL: 46 (ref 6–25)
CALCIUM SERPL-MCNC: 8.2 MG/DL (ref 8.4–10.2)
CALCIUM SERPL-MCNC: 8.8 MG/DL (ref 8.4–10.2)
CHLORIDE SERPL-SCNC: 102 MMOL/L (ref 98–107)
CHLORIDE SERPL-SCNC: 96 MMOL/L (ref 98–107)
CO2 SERPL-SCNC: 33 MMOL/L (ref 22–29)
CO2 SERPL-SCNC: 39 MMOL/L (ref 22–29)
DEPRECATED NEUTROPHILS # BLD AUTO: 10.5 10*3/UL (ref 2.1–6.9)
EGFRCR SERPLBLD CKD-EPI 2021: > 60 ML/MIN (ref 60–?)
EGFRCR SERPLBLD CKD-EPI 2021: > 60 ML/MIN (ref 60–?)
EOSINOPHIL # BLD AUTO: 0.1 10*3/UL (ref 0–0.4)
EOSINOPHIL NFR BLD AUTO: 0.6 % (ref 0–6)
ERYTHROCYTE [DISTWIDTH] IN CORD BLOOD: 16.2 % (ref 11.7–14.4)
GLOBULIN PLAS-MCNC: 4.4 G/DL (ref 2.3–3.5)
GLUCOSE SERPLBLD-MCNC: 134 MG/DL (ref 74–118)
GLUCOSE SERPLBLD-MCNC: 195 MG/DL (ref 74–118)
HCT VFR BLD AUTO: 35.5 % (ref 34.2–44.1)
HGB BLD-MCNC: 11.1 G/DL (ref 12–16)
LYMPHOCYTES # BLD: 0.5 10*3/UL (ref 1–3.2)
LYMPHOCYTES NFR BLD AUTO: 4 % (ref 18–39.1)
MAGNESIUM SERPL-MCNC: 3.1 MG/DL (ref 1.3–2.1)
MCH RBC QN AUTO: 30.6 PG (ref 28–32)
MCHC RBC AUTO-ENTMCNC: 31.3 G/DL (ref 31–35)
MCV RBC AUTO: 97.8 FL (ref 81–99)
MONOCYTES # BLD AUTO: 0.5 10*3/UL (ref 0.2–0.8)
MONOCYTES NFR BLD AUTO: 4.2 % (ref 4.4–11.3)
NEUTS SEG NFR BLD AUTO: 89 % (ref 38.7–80)
PLATELET # BLD AUTO: 371 X10E3/UL (ref 140–360)
POTASSIUM SERPL-SCNC: 3.7 MMOL/L (ref 3.5–5.1)
POTASSIUM SERPL-SCNC: 3.8 MMOL/L (ref 3.5–5.1)
RBC # BLD AUTO: 3.63 X10E6/UL (ref 3.6–5.1)
SODIUM SERPL-SCNC: 141 MMOL/L (ref 136–145)
SODIUM SERPL-SCNC: 148 MMOL/L (ref 136–145)

## 2020-04-14 RX ADMIN — SODIUM CHLORIDE SCH MLS/HR: 9 INJECTION, SOLUTION INTRAVENOUS at 04:35

## 2020-04-14 RX ADMIN — BUPROPION HYDROCHLORIDE SCH MG: 150 TABLET, FILM COATED ORAL at 15:19

## 2020-04-14 RX ADMIN — HYDROMORPHONE HYDROCHLORIDE PRN MG: 1 INJECTION, SOLUTION INTRAMUSCULAR; INTRAVENOUS; SUBCUTANEOUS at 05:55

## 2020-04-14 RX ADMIN — SODIUM CHLORIDE SCH ML: 900 IRRIGANT IRRIGATION at 05:55

## 2020-04-14 RX ADMIN — BISACODYL SCH MG: 10 SUPPOSITORY RECTAL at 20:21

## 2020-04-14 RX ADMIN — METOPROLOL TARTRATE PRN MG: 1 INJECTION, SOLUTION INTRAVENOUS at 06:53

## 2020-04-14 RX ADMIN — METOCLOPRAMIDE SCH MG: 5 INJECTION, SOLUTION INTRAMUSCULAR; INTRAVENOUS at 17:04

## 2020-04-14 RX ADMIN — TAZOBACTAM SODIUM AND PIPERACILLIN SODIUM SCH GM: 375; 3 INJECTION, SOLUTION INTRAVENOUS at 05:55

## 2020-04-14 RX ADMIN — HYDROMORPHONE HYDROCHLORIDE PRN MG: 1 INJECTION, SOLUTION INTRAMUSCULAR; INTRAVENOUS; SUBCUTANEOUS at 01:30

## 2020-04-14 RX ADMIN — METOCLOPRAMIDE SCH MG: 5 INJECTION, SOLUTION INTRAMUSCULAR; INTRAVENOUS at 00:13

## 2020-04-14 RX ADMIN — HYDROMORPHONE HYDROCHLORIDE PRN MG: 1 INJECTION, SOLUTION INTRAMUSCULAR; INTRAVENOUS; SUBCUTANEOUS at 10:33

## 2020-04-14 RX ADMIN — TAZOBACTAM SODIUM AND PIPERACILLIN SODIUM SCH GM: 375; 3 INJECTION, SOLUTION INTRAVENOUS at 14:55

## 2020-04-14 RX ADMIN — HYDROMORPHONE HYDROCHLORIDE PRN MG: 1 INJECTION, SOLUTION INTRAMUSCULAR; INTRAVENOUS; SUBCUTANEOUS at 17:41

## 2020-04-14 RX ADMIN — ENOXAPARIN SODIUM SCH MG: 30 INJECTION SUBCUTANEOUS at 08:46

## 2020-04-14 RX ADMIN — METOPROLOL TARTRATE PRN MG: 1 INJECTION, SOLUTION INTRAVENOUS at 00:14

## 2020-04-14 RX ADMIN — SODIUM CHLORIDE SCH ML: 900 IRRIGANT IRRIGATION at 09:17

## 2020-04-14 RX ADMIN — BISACODYL SCH MG: 10 SUPPOSITORY RECTAL at 08:46

## 2020-04-14 RX ADMIN — TAZOBACTAM SODIUM AND PIPERACILLIN SODIUM SCH GM: 375; 3 INJECTION, SOLUTION INTRAVENOUS at 22:20

## 2020-04-14 RX ADMIN — METOPROLOL TARTRATE PRN MG: 1 INJECTION, SOLUTION INTRAVENOUS at 12:09

## 2020-04-14 RX ADMIN — METOCLOPRAMIDE SCH MG: 5 INJECTION, SOLUTION INTRAMUSCULAR; INTRAVENOUS at 05:55

## 2020-04-14 RX ADMIN — SODIUM CHLORIDE PRN MG: 900 INJECTION INTRAVENOUS at 17:42

## 2020-04-14 RX ADMIN — METOPROLOL TARTRATE PRN MG: 1 INJECTION, SOLUTION INTRAVENOUS at 18:18

## 2020-04-14 RX ADMIN — SODIUM CHLORIDE SCH ML: 900 IRRIGANT IRRIGATION at 02:00

## 2020-04-14 RX ADMIN — SODIUM CHLORIDE PRN MG: 900 INJECTION INTRAVENOUS at 10:33

## 2020-04-14 RX ADMIN — METOCLOPRAMIDE SCH MG: 5 INJECTION, SOLUTION INTRAMUSCULAR; INTRAVENOUS at 12:09

## 2020-04-14 NOTE — PROGRESS NOTE
DATE:    

 

SUBJECTIVE:  The patient seen and examined today.  The patient appeared comfortable.

Clinically doing okay.  The patient has worsening leukocytosis.  Hemoglobin staying in

the same range.  No fever or chills reported.  Currently on IV TPN. 

 

OBJECTIVE:  VITAL SIGNS:  Reviewed. 

GENERAL:  No acute distress. 

HEENT:  Normocephalic and atraumatic.  Sclerae pale.  Conjunctivae clear. 

NECK:  Supple. 

CHEST:  Decreased breath sounds in the bases. 

CARDIOVASCULAR:  Regular rate and rhythm. 

ABDOMEN:  Soft, nondistended, nontender.  Bowel sounds present. 

MUSCULOSKELETAL:  No evidence of muscle defect. 

CNS:  Intact. 

SKIN:  Intact.

 

LABORATORY DATA:  Reviewed.  White cell count now trending upwards.  Hemoglobin is

staying in the same range. 

 

ASSESSMENT AND PLAN:  

1. The patient with a history of multiple medical conditions to include small bowel

obstruction, status post exploratory laparotomy, clinically doing better.  Bowel sounds

are present.  Currently on TPN.  Recommendation to continue current care.  We will

follow surgical recommendation. 

2. Breast cancer with metastasis.  She was on hormonal treatment to include Faslodex and

Ibrance.  Clinically doing okay.  Further oncological management as an outpatient. 

3. History of atrial fibrillation, currently on Lovenox for deep venous thrombosis

prophylaxis.  Continue remaining care.  We will follow the patient. 

 

 

 

 

______________________________

MD CALE Davila/JESSIE

D:  04/14/2020 08:16:39

T:  04/14/2020 08:49:10

Job #:  [id number]

## 2020-04-14 NOTE — PROGRESS NOTE
DATE:  04/14/2020

 

Medicine Progress Note 

 

SUBJECTIVE:  The patient is doing well today with no complaints.  NG tube was removed.

She is only on ice chips.  TPN was adjusted accordingly. 

 

PHYSICAL EXAMINATION:

VITAL SIGNS:  Temperature is 98, pulse 101, respiratory rate is 20, blood pressure

121/63, pulse ox 97% on room air. 

GENERAL:  No acute distress.  Alert and oriented x3.  Cooperative on examination. 

HEENT:  Normocephalic, atraumatic.  Eyes; pupils are equal, round, and reactive to light

bilaterally.  Extraocular movements intact bilaterally.  Throat, no evidence of erythema

or exudate in the posterior pharynx.  She has poor dentition. 

NECK:  Supple.  Good range of motion. 

PULMONARY:  Clear to auscultation bilaterally.  No wheezing, rales, or rhonchi.  No

crackles appreciated. 

CARDIOVASCULAR:  Positive S1 and S2.  No murmurs, rubs, or gallops appreciated. 

ABDOMEN:  Soft, nondistended, nontender to palpation.  Bowel sounds present. 

MUSCULOSKELETAL:  Strength is 5/5 throughout.  No evidence of any muscle deficits on

examination.  No weakness appreciated. 

NEUROLOGIC:  Cranial nerves 2 through 12 are grossly intact.  No evidence of any

neurological deficits on exam. 

SKIN:  Intact.  Warm to touch.  Good cap refill. 

PSYCHIATRIC:  Normal affect and mood. 

EXTREMITIES:  No edema.  Good range of motion throughout.

LABORATORY FINDINGS:  White count 11.7, hemoglobin 11.1, hematocrit 35.5, platelets of

371.  Coagulation; PT 13, INR 1, PTT 28.  Chemistry; sodium 148, potassium 3.7, chloride

96, bicarb 39, anion gap of 16, BUN is 34, creatinine is 0.81 with a glucose of 195,

calcium is 8.8, magnesium 3.1.  LFTs noted.  Troponins were negative. 

 

IMPRESSION:  

1. Status post exploratory laparotomy, resection of small bowel with complete bowel

obstruction secondary to closed loop obstruction with small-bowel resection, stapled

side-to-side functional end-to-end anastomosis, postop day #5. 

2. Breast cancer with metastasis.

3. Abdominal pain, nausea, vomiting secondary to #1.

4. Recent diagnosis of atrial fibrillation.

5. Hypertension.

 

PLAN:  At this time, NG tube was removed.  I did adjust the TPN accordingly based on her

current electrolytes.  She will receive normal saline bolus 1 L over 2 hours as the

patient looks clinically dehydrated.  She was negative over the last several days based

on the output noted in 

the computer.  She is otherwise doing well.  Encourage ambulation.  Lovenox for DVT

prophylaxis.  A.mVeronica labs. 

 

 

 

 

______________________________

MD SELWYN Valentine/JESSIE

D:  04/14/2020 20:52:11

T:  04/14/2020 22:25:51

Job #:  [id number]

## 2020-04-14 NOTE — NUR
RECEIVED REPORT FROM NIGHT SHIFT NURSE, PATIENT IS AWAKE IN BED, NO DISTRESS NOTED, 02 2L NC 
IN PLACE, TELEMETRY MONITOR IN PLACE, NG TO LEFT NARE CLAMPED AT THIS TIME. TPN RUNNING TO 
RIGHT UPPER ARM PICC LINE, INCENTIVE SPIROMETER AT BEDSIDE PATIENT INSTRUCTED ON BENEFITS OF 
DOING INCENTIVE SPIROMETER BY NURSE AND RESPIRATORY TECH. PATIENT VOICED UNDERSTANDING. BED 
IS LOW AND LOCKED, SIDE RAILS UPX2, CALL LIGHT WITHIN REACH.

## 2020-04-14 NOTE — NUR
Nutrition Intervention Note



RD Recommendation(s) for Physician: 

-TPN Rec's: Decrease rate to 70 ml/hr, decrease NaCl to 20 mEq/L, remove Mg Sulfate. 
Continue other components as ordered.

- Please check BMP with Mg and Phos daily, replace low lytes as needed. Check TG weekly.

- BG and insulin management per MD. 

- Advance diet when medically appropriate to GI soft

The patient meets criteria for MODERATE protein-calorie malnutrition. 





Plan of Care: RD following, monitoring for tolerance and adequacy, TPN rec's



Nutrition reason for involvement: follow up, TPN



RD Assessment

4/14: Follow up. Pt currently on TPN at 100 ml/hr, remains NPO. Pt with CT today. Noted 
elevated Na and Mg on standard TPN. Spoke with RN regarding TPN and message left with MD to 
discuss TPN rec's. Per RN Dr. Collins modifying TPN for this evening, no return call to discuss 
recommendations. 



(4/10/20) Pt is a 64 year old female admitted with chemotherapy induced neutropenia, SBO, 
and vomiting. Pt had an exploratory laparotomy and bowel resection yesterday. Pt has been 
receiving PPN since 4/6.  Pt is planned to have PICC line placed today and is to be started 
on TPN per chart. Pt reports she has eating 50 % to sometimes >50% of her meals prior to 
admission. Pt also mentioned she had lost weight due to cancer and used to weigh 205 lbs 1 
year ago. Pt currently has a weight of 158 lbs in chart. This would be a 23% weight loss in 
1 year  significant weight loss. Pt reports some nausea. No chewing/swallowing isuses. Will 
continue to monitor.



Principal Problems/Diagnoses: chemotherapy induced neutropenia, SBO, and vomiting



PMH: Breast cancer with metastasis to the bone, hypertension, acid reflux, neuropathy, 
recent diagnosis of atrial fibrillation.



GI: flat, soft, tender abdomen, last recorded BM 4/14



Skin: no pressure ulcers, abdominal incisions



Labs: 4/14: Na 148, K 3.7, BUN 34, Cr 0.81, Gluc 195, Mg 3.1



Meds: reglan, dilaudid, zofran, KCl IVPB



Ht: 65 inches

Wt: 158 lbs

BMI: 26.3 kg/m2

IBW: 125 lbs





Malnutrition Evaluation (4/10/20)

The patient meets criteria for MODERATE protein-calorie malnutrition. 



Energy intake:

<75% of estimated energy requirements for >7 days

Weight loss:

>20% in 1 year (Chronic)

Fat loss: unable to evaluate

Muscle loss: unable to evaluate

Supporting Evidence:

Fluid accumulation: no edema per MD note

Functional Status: unable to evaluate



Nutrition Prescription (Diet Order): NPO

TPN at 100 mL/hr, D30% 500 ml/L, AA10% 500 ml/L, 25 gm lipids/day, standard lytes, MVI, 
trace, folic acid, thiamine, vitamin K on Monday, 5 units insulin/L. Provides 360g 
dextrose/day, 120 gm protein/day, 1954 kcal/day. 



Estimated Nutritional Needs:

1711-5906 calories/day (18-20 kcal/kg CBW)

 g protein/day (1-1.5 g pro/kg CBW)



Diet Adequacy:

meeting calorie needs, meeting protein needs- current TPN providing excess kcal and protein 



Tolerance: tolerating TPN





Diet Education Needs Assessment: Diet education not indicated, patient on 
temporary/transition diet.



Nutrition Care Level: high



Nutrition Diagnosis: Moderate malnutrition related to medical condition as evidenced by pt 
meeting <75% of estimated energy needs for > 7 days and >20% weight loss in 1 year.





Goal: Patient will meet % of estimated needs by follow up 



Progress: progressing



Interventions:

-Composition, Rate, Route, Recommended Modifications, Collaborations with other providers



Monitoring/Evaluation:

-Total energy intake, Total protein intake, Formula/Solution,, Weight change



Signed: June Castillo RD, LD, [facility]

## 2020-04-14 NOTE — DIAGNOSTIC IMAGING REPORT
EXAM: CT of the abdomen and pelvis without intravenous contrast



HISTORY: 

^R/O ILEUS



COMPARISON: None



TECHNIQUE: Abdomen and pelvis were scanned utilizing a multidetector helical

scanner. Coronal and sagittal reformations were obtained.



DOSE REDUCTION: The examination was performed according to the departmental

dose-optimization program, which includes automated exposure control,

adjustment of the mA and/or kV according to patient size and/or use of

iterative reconstruction technique.     

            

FINDINGS:

THE ABSENCE OF INTRAVENOUS CONTRAST DECREASES THE SENSITIVITY FOR DETECTION OF

FOCAL LESIONS AND VASCULAR PATHOLOGY.

LINES and TUBES: Enteric catheter terminates in the stomach. Spinal hardware.

LOWER THORAX: Bibasilar atelectasis, with left lower lobe bronchiectasis

posterior segment tree-in-bud opacities and bronchiectasis.

HEPATOBILIARY: Innumerable hypodense hepatic lesions. 

GALLBLADDER: Unremarkable.

SPLEEN: Unremarkable. 

PANCREAS: Unremarkable.  

ADRENALS: Unremarkable.

KIDNEYS/URETERS: Unremarkable.

GI TRACT: Numerous postsurgical changes in the small and large bowel in the

right lower quadrant. Multiple peripheral foci of air at the cecal wall.

PELVIC ORGANS/BLADDER: Unremarkable.

LYMPH NODES: Unremarkable

VESSELS: Vascular calcifications.

PERITONEUM / RETROPERITONEUM: No free fluid. Multiple perihepatic and

pericystic small foci of free air. 

BONES: Diffuse bony lesions. L4 laminectomy.

SOFT TISSUES: Unremarkable.



IMPRESSION: 

The absence of intravenous contrast decreases the sensitivity for detection of

focal lesions and vascular pathology.



1. No specific findings of ileus.



2. Findings suggestive of cecal pneumatosis coli may represent ischemia.

Correlate with physical exam and laboratory values. Additional small amount of

pneumoperitoneum within the right upper quadrant could be related to this or

recent surgical intervention.



3. Innumerable bony and hepatic lesions consistent with metastases.



4. Left lower lung small area of infection.



Signed by: Jose A Mays MD on 4/14/2020 12:22 PM

## 2020-04-14 NOTE — NUR
REMOVED NG TUBE PER DR. MARIA ESTHER MALIN ORDERS. PATIENT TOLERATED PROCEDURE WELL. NO DISTRESS 
NOTED. BED LOW AND LOCKED, SIDE RAIL UPX2, CALL LIGHT WITHIN REACH.

## 2020-04-15 VITALS — SYSTOLIC BLOOD PRESSURE: 174 MMHG | DIASTOLIC BLOOD PRESSURE: 92 MMHG

## 2020-04-15 VITALS — SYSTOLIC BLOOD PRESSURE: 168 MMHG | DIASTOLIC BLOOD PRESSURE: 83 MMHG

## 2020-04-15 VITALS — SYSTOLIC BLOOD PRESSURE: 152 MMHG | DIASTOLIC BLOOD PRESSURE: 87 MMHG

## 2020-04-15 VITALS — SYSTOLIC BLOOD PRESSURE: 164 MMHG | DIASTOLIC BLOOD PRESSURE: 86 MMHG

## 2020-04-15 VITALS — DIASTOLIC BLOOD PRESSURE: 77 MMHG | SYSTOLIC BLOOD PRESSURE: 148 MMHG

## 2020-04-15 VITALS — SYSTOLIC BLOOD PRESSURE: 127 MMHG | DIASTOLIC BLOOD PRESSURE: 83 MMHG

## 2020-04-15 LAB
ANION GAP SERPL CALC-SCNC: 10.8 MMOL/L (ref 8–16)
BASOPHILS # BLD AUTO: 0.1 10*3/UL (ref 0–0.1)
BASOPHILS NFR BLD AUTO: 0.9 % (ref 0–1)
BUN SERPL-MCNC: 27 MG/DL (ref 7–26)
BUN/CREAT SERPL: 44 (ref 6–25)
CALCIUM SERPL-MCNC: 7.5 MG/DL (ref 8.4–10.2)
CHLORIDE SERPL-SCNC: 102 MMOL/L (ref 98–107)
CO2 SERPL-SCNC: 30 MMOL/L (ref 22–29)
DEPRECATED NEUTROPHILS # BLD AUTO: 8.2 10*3/UL (ref 2.1–6.9)
EGFRCR SERPLBLD CKD-EPI 2021: > 60 ML/MIN (ref 60–?)
EOSINOPHIL # BLD AUTO: 0.2 10*3/UL (ref 0–0.4)
EOSINOPHIL NFR BLD AUTO: 2.2 % (ref 0–6)
ERYTHROCYTE [DISTWIDTH] IN CORD BLOOD: 15.9 % (ref 11.7–14.4)
GLUCOSE SERPLBLD-MCNC: 147 MG/DL (ref 74–118)
HCT VFR BLD AUTO: 30.7 % (ref 34.2–44.1)
HGB BLD-MCNC: 9.7 G/DL (ref 12–16)
LYMPHOCYTES # BLD: 0.5 10*3/UL (ref 1–3.2)
LYMPHOCYTES NFR BLD AUTO: 5.6 % (ref 18–39.1)
MAGNESIUM SERPL-MCNC: 2.2 MG/DL (ref 1.3–2.1)
MCH RBC QN AUTO: 31.4 PG (ref 28–32)
MCHC RBC AUTO-ENTMCNC: 31.6 G/DL (ref 31–35)
MCV RBC AUTO: 99.4 FL (ref 81–99)
MONOCYTES # BLD AUTO: 0.4 10*3/UL (ref 0.2–0.8)
MONOCYTES NFR BLD AUTO: 4.6 % (ref 4.4–11.3)
NEUTS SEG NFR BLD AUTO: 84.8 % (ref 38.7–80)
PLATELET # BLD AUTO: 331 X10E3/UL (ref 140–360)
POTASSIUM SERPL-SCNC: 3.8 MMOL/L (ref 3.5–5.1)
RBC # BLD AUTO: 3.09 X10E6/UL (ref 3.6–5.1)
SODIUM SERPL-SCNC: 139 MMOL/L (ref 136–145)

## 2020-04-15 RX ADMIN — HYDROMORPHONE HYDROCHLORIDE PRN MG: 1 INJECTION, SOLUTION INTRAMUSCULAR; INTRAVENOUS; SUBCUTANEOUS at 14:16

## 2020-04-15 RX ADMIN — BISACODYL SCH MG: 10 SUPPOSITORY RECTAL at 20:48

## 2020-04-15 RX ADMIN — METOCLOPRAMIDE SCH MG: 5 INJECTION, SOLUTION INTRAMUSCULAR; INTRAVENOUS at 00:05

## 2020-04-15 RX ADMIN — METOCLOPRAMIDE SCH MG: 5 INJECTION, SOLUTION INTRAMUSCULAR; INTRAVENOUS at 05:25

## 2020-04-15 RX ADMIN — METOCLOPRAMIDE SCH MG: 5 INJECTION, SOLUTION INTRAMUSCULAR; INTRAVENOUS at 12:10

## 2020-04-15 RX ADMIN — HYDROMORPHONE HYDROCHLORIDE PRN MG: 1 INJECTION, SOLUTION INTRAMUSCULAR; INTRAVENOUS; SUBCUTANEOUS at 08:13

## 2020-04-15 RX ADMIN — METOCLOPRAMIDE SCH MG: 5 INJECTION, SOLUTION INTRAMUSCULAR; INTRAVENOUS at 18:18

## 2020-04-15 RX ADMIN — VALSARTAN SCH MG: 80 TABLET ORAL at 20:44

## 2020-04-15 RX ADMIN — METOPROLOL TARTRATE SCH MG: 50 TABLET, FILM COATED ORAL at 20:44

## 2020-04-15 RX ADMIN — ENOXAPARIN SODIUM SCH MG: 30 INJECTION SUBCUTANEOUS at 08:12

## 2020-04-15 RX ADMIN — BUPROPION HYDROCHLORIDE SCH MG: 150 TABLET, FILM COATED ORAL at 08:12

## 2020-04-15 RX ADMIN — HYDROMORPHONE HYDROCHLORIDE PRN MG: 1 INJECTION, SOLUTION INTRAMUSCULAR; INTRAVENOUS; SUBCUTANEOUS at 02:50

## 2020-04-15 RX ADMIN — BISACODYL SCH MG: 10 SUPPOSITORY RECTAL at 08:12

## 2020-04-15 RX ADMIN — HYDROMORPHONE HYDROCHLORIDE PRN MG: 1 INJECTION, SOLUTION INTRAMUSCULAR; INTRAVENOUS; SUBCUTANEOUS at 20:53

## 2020-04-15 RX ADMIN — METOPROLOL TARTRATE PRN MG: 1 INJECTION, SOLUTION INTRAVENOUS at 05:26

## 2020-04-15 NOTE — PROGRESS NOTE
DATE:    

 

SUBJECTIVE:  The patient seen and examined today.  The patient appeared comfortable,

clinically doing better.  The patient's NG tube was removed.  She is more comfortable.

Currently on TPN. 

 

OBJECTIVE:  GENERAL:  Alert, awake, communicative. 

HEENT:  Normocephalic, atraumatic.  Sclerae pale.  Conjunctivae clear. 

Neck:  Supple. 

LUNGS:  Decreased breath sounds in the bases. 

CARDIOVASCULAR:  Regular rate and rhythm. 

ABDOMEN:  Soft, nontender. 

EXTREMITIES:  No edema.

 

LABORATORY DATA:  Labs and imaging reviewed.

 

ASSESSMENT AND PLAN:  

1. The patient with history of small bowel with complete bowel obstruction, status post

exploratory laparotomy, clinically doing better.  Currently on TPN.  Following present.

Recommendation to continue current care.  We will monitor. 

2. Breast cancer with metastasis, was on Faslodex and Ibrance.  Recommendation to follow

with the primary oncologist.  Resume treatment as per oncologist. 

3. Continue Lovenox for deep venous thrombosis prophylaxis.

4. Current lab shows mild anemia, multifactorial.  Avoid frequent blood draws.  We will

monitor. 

 

 

 

 

______________________________

MD CALE Davila/JESSIE

D:  04/15/2020 08:30:00

T:  04/15/2020 08:51:08

Job #:  [id number]

## 2020-04-15 NOTE — NUR
RECEIVED REPORT FROM AM NURSE. LAYING SEMI FOWLERS IN BED. AAOX3. REPORTS L FOOT PAIN, 
INFORMED BY AM NURSE THAT L FOOT PAIN MADE AWARE TO MD. RECEIVING TPN TO RIGHT UPPER ARM 
PICC. NO SIGNS OF INFILTRATION. CALL LIGHT WITHIN REACH, BED LOCKED AND IN LOW POSITION.

## 2020-04-15 NOTE — PROGRESS NOTE
DATE:  04/15/2020  

 

SUBJECTIVE:  The patient is doing well today with no complaints.  She is now on a full

liquid diet.  We are going to finish TPN, no more TPN needed. 

 

LABORATORY DATA:  Labs show white count 9.6, hemoglobin 9.7, hematocrit 31, platelets of

331,000.  Coagulation; PT 13.9, INR 1, PTT 28.5.  Chemistry; sodium 139, potassium 3.8,

chloride 102, bicarb 30, anion gap of 10, BUN is 27, creatinine is 0.62, glucose 147,

calcium 7.5, magnesium 2.2. 

 

IMAGING STUDIES:  None today.

 

PHYSICAL EXAMINATION:

VITAL SIGNS:  Temperature is 97.3, pulse is 115, respiratory rate is 19, blood pressure

is 160/83, pulse ox 98% on room air. 

GENERAL:  Not in acute distress, alert and oriented x3.   

Cooperative on examination. 

HEENT:  Head normocephalic, atraumatic.  Eyes; pupils are equal, round, and reactive to

light bilaterally.  Extraocular movements are intact. 

NECK:  Supple.  Good range of motion.  Throat, no evidence of erythema or exudate in the

posterior pharynx.  Has poor dentition. 

PULMONARY:  Clear to auscultation bilaterally.  No wheezing, rales, rhonchi, or crackles

appreciated. 

CARDIOVASCULAR:  Positive S1 and S2.  No murmurs, rubs, or gallops. 

ABDOMEN:  Soft, nondistended, nontender to palpation.  Bowel sounds present. 

MUSCULOSKELETAL:  Strength is 5/5 throughout.  No evidence of any muscle deficits on

examination.  No weakness appreciated. 

NEUROLOGIC:  Cranial nerves II through XII grossly intact.  No evidence of any

neurological deficits on exam. 

SKIN:  Intact.  Warm to touch.  Good cap refill. 

PSYCHIATRIC:  Normal affect and mood. 

EXTREMITIES:  No edema.  Good range of motion throughout.

IMPRESSION:  

1. Status post exploratory laparotomy, resection of small bowel with complete bowel

obstruction secondary to open obstruction with small-bowel resection, stable

side-to-side functional end to end anastomosis postop day #6. 

2. Breast cancer with metastasis.

3. Abdominal pain, nausea and vomiting secondary to #1.

4. Recent diagnosis of atrial fibrillation.

5. Hypertension.

 

PLAN:  At this time, I did review the CT scan findings with this general surgeon, who

reports these are normal findings and there is no other further workup needed or any

other intervention needed at this time.  NG tube was removed.  I will continue to follow

General Surgery recommendations in relation to the CT scan as well as other surgical

intervention on this patient.  We will continue with TPN until __________.  Continue

with full liquid diet as per General 

Surgery recommendations.  Get a.m. labs.  Encourage ambulation.  Lovenox for DVT

prophylaxis. 

 

 

 

 

______________________________

MD SELWYN Valentine/JESSIE

D:  04/15/2020 20:12:23

T:  04/15/2020 21:20:45

Job #:  [id number]

## 2020-04-16 VITALS — DIASTOLIC BLOOD PRESSURE: 76 MMHG | SYSTOLIC BLOOD PRESSURE: 131 MMHG

## 2020-04-16 VITALS — SYSTOLIC BLOOD PRESSURE: 127 MMHG | DIASTOLIC BLOOD PRESSURE: 74 MMHG

## 2020-04-16 VITALS — DIASTOLIC BLOOD PRESSURE: 88 MMHG | SYSTOLIC BLOOD PRESSURE: 147 MMHG

## 2020-04-16 VITALS — SYSTOLIC BLOOD PRESSURE: 127 MMHG | DIASTOLIC BLOOD PRESSURE: 58 MMHG

## 2020-04-16 VITALS — DIASTOLIC BLOOD PRESSURE: 66 MMHG | SYSTOLIC BLOOD PRESSURE: 129 MMHG

## 2020-04-16 VITALS — SYSTOLIC BLOOD PRESSURE: 141 MMHG | DIASTOLIC BLOOD PRESSURE: 78 MMHG

## 2020-04-16 LAB
ANION GAP SERPL CALC-SCNC: 12.1 MMOL/L (ref 8–16)
BASOPHILS # BLD AUTO: 0.1 10*3/UL (ref 0–0.1)
BASOPHILS NFR BLD AUTO: 1 % (ref 0–1)
BUN SERPL-MCNC: 16 MG/DL (ref 7–26)
BUN/CREAT SERPL: 27 (ref 6–25)
CALCIUM SERPL-MCNC: 8.9 MG/DL (ref 8.4–10.2)
CHLORIDE SERPL-SCNC: 99 MMOL/L (ref 98–107)
CO2 SERPL-SCNC: 28 MMOL/L (ref 22–29)
DEPRECATED NEUTROPHILS # BLD AUTO: 7.5 10*3/UL (ref 2.1–6.9)
EGFRCR SERPLBLD CKD-EPI 2021: > 60 ML/MIN (ref 60–?)
EOSINOPHIL # BLD AUTO: 0.2 10*3/UL (ref 0–0.4)
EOSINOPHIL NFR BLD AUTO: 2.2 % (ref 0–6)
ERYTHROCYTE [DISTWIDTH] IN CORD BLOOD: 15.3 % (ref 11.7–14.4)
GLUCOSE SERPLBLD-MCNC: 93 MG/DL (ref 74–118)
HCT VFR BLD AUTO: 31.3 % (ref 34.2–44.1)
HGB BLD-MCNC: 10.2 G/DL (ref 12–16)
LYMPHOCYTES # BLD: 0.7 10*3/UL (ref 1–3.2)
LYMPHOCYTES NFR BLD AUTO: 7.3 % (ref 18–39.1)
MAGNESIUM SERPL-MCNC: 1.6 MG/DL (ref 1.3–2.1)
MCH RBC QN AUTO: 31.2 PG (ref 28–32)
MCHC RBC AUTO-ENTMCNC: 32.6 G/DL (ref 31–35)
MCV RBC AUTO: 95.7 FL (ref 81–99)
MONOCYTES # BLD AUTO: 0.5 10*3/UL (ref 0.2–0.8)
MONOCYTES NFR BLD AUTO: 5.3 % (ref 4.4–11.3)
NEUTS SEG NFR BLD AUTO: 82.2 % (ref 38.7–80)
PLATELET # BLD AUTO: 362 X10E3/UL (ref 140–360)
POTASSIUM SERPL-SCNC: 4.1 MMOL/L (ref 3.5–5.1)
RBC # BLD AUTO: 3.27 X10E6/UL (ref 3.6–5.1)
SODIUM SERPL-SCNC: 135 MMOL/L (ref 136–145)

## 2020-04-16 RX ADMIN — METOCLOPRAMIDE SCH MG: 5 INJECTION, SOLUTION INTRAMUSCULAR; INTRAVENOUS at 12:13

## 2020-04-16 RX ADMIN — METOCLOPRAMIDE SCH MG: 5 INJECTION, SOLUTION INTRAMUSCULAR; INTRAVENOUS at 06:00

## 2020-04-16 RX ADMIN — METOPROLOL TARTRATE SCH MG: 50 TABLET, FILM COATED ORAL at 10:07

## 2020-04-16 RX ADMIN — VALSARTAN SCH MG: 80 TABLET ORAL at 10:07

## 2020-04-16 RX ADMIN — SODIUM CHLORIDE PRN MG: 900 INJECTION INTRAVENOUS at 14:44

## 2020-04-16 RX ADMIN — BUPROPION HYDROCHLORIDE SCH MG: 150 TABLET, FILM COATED ORAL at 10:07

## 2020-04-16 RX ADMIN — METOCLOPRAMIDE SCH MG: 5 INJECTION, SOLUTION INTRAMUSCULAR; INTRAVENOUS at 00:00

## 2020-04-16 RX ADMIN — HYDROMORPHONE HYDROCHLORIDE PRN MG: 1 INJECTION, SOLUTION INTRAMUSCULAR; INTRAVENOUS; SUBCUTANEOUS at 01:01

## 2020-04-16 RX ADMIN — ENOXAPARIN SODIUM SCH MG: 30 INJECTION SUBCUTANEOUS at 10:07

## 2020-04-16 RX ADMIN — BISACODYL SCH MG: 10 SUPPOSITORY RECTAL at 09:00

## 2020-04-16 RX ADMIN — METOCLOPRAMIDE SCH MG: 5 INJECTION, SOLUTION INTRAMUSCULAR; INTRAVENOUS at 18:33

## 2020-04-16 NOTE — NUR
bedside shift change report received from PM nurse. pt awake, alert, oriented, no signs of 
distress. will continue to monitor.

## 2020-04-16 NOTE — PROGRESS NOTE
DATE:  04/16/2020  

 

SUBJECTIVE:  The patient is doing well today with no complaints.  No overnight events.

She is now on a regular diet. 

 

PHYSICAL EXAMINATION:

VITAL SIGNS:  Temperature is 98.6, pulse 99, respiratory rate is 18, blood pressure is

127/58, pulse ox 94% on room air. 

GENERAL:  Not in acute distress.  Alert and oriented x3.  Cooperative on examination. 

HEENT:  Head; normocephalic, atraumatic.  Eyes; pupils are equal, round, and reactive to

light bilaterally.  Extraocular movements intact bilaterally. 

NECK:  Supple.  Good range of motion throughout.  Throat; no evidence of erythema or

exudates in the posterior pharynx.  Has poor dentition. 

PULMONARY:  Clear to auscultation bilaterally.  No wheezing, no rales, no rhonchi, no

crackles appreciated. 

CARDIOVASCULAR:  Positive S1 and S2.  No murmurs, rubs, or gallops appreciated. 

ABDOMEN:  Soft, nondistended, and nontender to palpation.  Bowel sounds present. 

MUSCULOSKELETAL:  Strength is 5/5 throughout.  No evidence of any muscle deficits on

examination.  No weakness appreciated. 

NEUROLOGIC:  Cranial nerve II through XII grossly intact.  No evidence of any

neurological deficits on exam. 

SKIN:  Intact.  Warm to touch.  Good cap refill. 

PSYCHIATRIC:  Normal affect and mood. 

EXTREMITIES:  No edema.  Good range of motion throughout.

LABORATORY DATA:  Show CBC stable.  Chemistry reviewed, stable.

 

IMPRESSION:  

1. Status post exploratory laparotomy, resection of small bowel with complete bowel

obstruction secondary to open obstruction with small bowel resection, stable

side-to-side functional, end-to-end anastomosis postop day #7. 

2. Breast cancer with metastasis.

3. Abdominal pain with nausea and vomiting secondary to #1.

4. Recent diagnosis of atrial fibrillation.

5. Hypertension.

 

PLAN:  At this time, the patient is doing well.  She is on a regular diet.  NG tube was

removed.  She is ambulating well.  Discontinue all TPN.  It seems that the patient will

likely be discharged tomorrow according to the general surgery's note.  I did discuss

the CT findings with Surgery.  At this time, he is not concerned and has encouraged

increased oral intake. 

 

 

 

 

______________________________

MD SELWYN Valentine/MODL

D:  04/16/2020 21:59:18

T:  04/16/2020 23:34:57

Job #:  [id number]

## 2020-04-16 NOTE — NUR
Nutrition Intervention Note



RD Recommendation(s) for Physician: 

- Advance diet when medically appropriate to GI soft



The patient meets criteria for MODERATE protein-calorie malnutrition. 



Plan of Care: RD following, monitoring for tolerance and adequacy



Nutrition reason for involvement: follow up



RD Assessment

4/16: Chart reviewed. Visited pt in the room. TPN has been stopped. Pt started on full 
liquid diet. Pt complained of some nausea with an episode of vomiting. +BM.  IV Zofran was 
administered. Will continue to monitor and follow. 



4/14: Follow up. Pt currently on TPN at 100 ml/hr, remains NPO. Pt with CT today. Noted 
elevated Na and Mg on standard TPN. Spoke with RN regarding TPN and message left with MD to 
discuss TPN rec's. Per RN Dr. Collins modifying TPN for this evening, no return call to discuss 
recommendations. 



(4/10/20) Pt is a 64 year old female admitted with chemotherapy induced neutropenia, SBO, 
and vomiting. Pt had an exploratory laparotomy and bowel resection yesterday. Pt has been 
receiving PPN since 4/6.  Pt is planned to have PICC line placed today and is to be started 
on TPN per chart. Pt reports she has eating 50 % to sometimes >50% of her meals prior to 
admission. Pt also mentioned she had lost weight due to cancer and used to weigh 205 lbs 1 
year ago. Pt currently has a weight of 158 lbs in chart. This would be a 23% weight loss in 
1 year  significant weight loss. Pt reports some nausea. No chewing/swallowing isuses. Will 
continue to monitor.



Principal Problems/Diagnoses: chemotherapy induced neutropenia, SBO, and vomiting



PMH: Breast cancer with metastasis to the bone, hypertension, acid reflux, neuropathy, 
recent diagnosis of atrial fibrillation.



GI: flat, soft, tender abdomen, last recorded BM 4/16



Skin: no pressure ulcers, abdominal incisions



Labs: 4/16: Na 135 L 



Meds: reglan, zofran, lovenox, dilaudid 



Ht: 65 inches          

Wt: 158 lbs          

BMI: 26.3 kg/m2          

IBW: 125 lbs     



Malnutrition Evaluation (4/10/20)

The patient meets criteria for MODERATE protein-calorie malnutrition. 



Energy intake:

<75% of estimated energy requirements for >7 days



Weight loss:

>20% in 1 year (Chronic)



Fat loss: unable to evaluate

Muscle loss: unable to evaluate



Supporting Evidence:

Fluid accumulation: no edema per MD note

Functional Status: unable to evaluate



Nutrition Prescription (Diet Order): Full liquid diet 



Estimated Nutritional Needs:

2040-4856 calories/day (18-20 kcal/kg CBW)

 g protein/day (1-1.5 g pro/kg CBW)



Diet Adequacy:

Not meeting calorie needs, Not meeting protein needs - full liquid diet 



Tolerance: Not tolerating PO 



Diet Education Needs Assessment: Diet education not indicated, patient on 
temporary/transition diet.



Nutrition Care Level: high



Nutrition Diagnosis: Moderate malnutrition related to medical condition as evidenced by pt 
meeting <75% of estimated energy needs for > 7 days and >20% weight loss in 1 year.



Goal: Patient will meet % of estimated needs by follow up 



Progress: not progressing



Interventions:

-Modified diet 



Monitoring/Evaluation:

-Total energy intake, Total protein intake, Modified diet, Weight change



Signed: Ana Rosa Navarro, MS, RD, LD

## 2020-04-16 NOTE — NUR
REPORT GIVEN TO ONCOMING NURSE. AAOX3. RESTING IN BED. NO SIGNS OF INFILTRATION TO R  
PICC. CALL LIGHT WITHIN REACH. BED LOCKED AND IN LOW POSITION.

## 2020-04-16 NOTE — NUR
RECEIVED REPORT FROM AM NURSE. AAOX3. R  PICC SALINE LOCK. NO SIGNS OF INFILTRATION. 
RESTING IN BED. BED LOCKED AND IN LOW POSITION. CALL LIGHT WITHIN REACH.

## 2020-04-16 NOTE — PROGRESS NOTE
DATE:    

 

SUBJECTIVE:  The patient seen and examined today.  The patient appears comfortable.

Clinically doing better.  No new complaints noted.  She was on full liquid diet.  She is

also on TPN. 

 

OBJECTIVE:  GENERAL:  Alert, awake, and communicative. 

HEENT:  Normocephalic, atraumatic.  Sclerae pale.  Conjunctivae clear. 

NECK:  Supple. 

CHEST:  Decreased breath sounds in the bases. 

ABDOMEN:  Soft. 

EXTREMITIES:  No edema.

 

LABORATORY DATA:  Labs, imaging, and CAT scan reviewed.  CAT scan shows no specific

finding of ileus.  Findings suggestive of cecal pneumatosis coli may represent ischemia.

 Continue to show bones and liver lesions with the mets. 

 

ASSESSMENT AND PLAN:  The patient with history of multiple medical conditions includes

metastatic breast cancer, admitted with a small-bowel obstruction status post surgery.

The patient recent CAT scan as per surgeon shows normal postoperative finding.  There is

no other further workup needed or other intervention in this condition.  The patient is

on a liquid diet, tolerating.  Continue follow surgical recommendation. 

 

Metastatic breast cancer.  The patient is on Faslodex and Ibrance, continue as

outpatient.  The patient has followup appointment on __________ with oncologists. 

 

Neutropenia.  White cell count is stable. 

 

Anemia, chronic in nature.  Hemoglobin 10, better.  Continue current care. 

 

We will follow the patient.

 

 

 

 

______________________________

MD CALE Davila/REGGIEL

D:  04/16/2020 08:26:26

T:  04/16/2020 08:48:35

Job #:  [id number]

## 2020-04-17 VITALS — SYSTOLIC BLOOD PRESSURE: 123 MMHG | DIASTOLIC BLOOD PRESSURE: 70 MMHG

## 2020-04-17 VITALS — SYSTOLIC BLOOD PRESSURE: 125 MMHG | DIASTOLIC BLOOD PRESSURE: 70 MMHG

## 2020-04-17 VITALS — SYSTOLIC BLOOD PRESSURE: 129 MMHG | DIASTOLIC BLOOD PRESSURE: 76 MMHG

## 2020-04-17 VITALS — DIASTOLIC BLOOD PRESSURE: 67 MMHG | SYSTOLIC BLOOD PRESSURE: 117 MMHG

## 2020-04-17 LAB
ANION GAP SERPL CALC-SCNC: 13.8 MMOL/L (ref 8–16)
BASOPHILS # BLD AUTO: 0.1 10*3/UL (ref 0–0.1)
BASOPHILS NFR BLD AUTO: 0.8 % (ref 0–1)
BUN SERPL-MCNC: 18 MG/DL (ref 7–26)
BUN/CREAT SERPL: 26 (ref 6–25)
CALCIUM SERPL-MCNC: 9.1 MG/DL (ref 8.4–10.2)
CHLORIDE SERPL-SCNC: 101 MMOL/L (ref 98–107)
CO2 SERPL-SCNC: 25 MMOL/L (ref 22–29)
DEPRECATED NEUTROPHILS # BLD AUTO: 6.1 10*3/UL (ref 2.1–6.9)
EGFRCR SERPLBLD CKD-EPI 2021: > 60 ML/MIN (ref 60–?)
EOSINOPHIL # BLD AUTO: 0.1 10*3/UL (ref 0–0.4)
EOSINOPHIL NFR BLD AUTO: 1.3 % (ref 0–6)
ERYTHROCYTE [DISTWIDTH] IN CORD BLOOD: 15.3 % (ref 11.7–14.4)
GLUCOSE SERPLBLD-MCNC: 98 MG/DL (ref 74–118)
HCT VFR BLD AUTO: 32.5 % (ref 34.2–44.1)
HGB BLD-MCNC: 10.4 G/DL (ref 12–16)
LYMPHOCYTES # BLD: 0.7 10*3/UL (ref 1–3.2)
LYMPHOCYTES NFR BLD AUTO: 8.8 % (ref 18–39.1)
MCH RBC QN AUTO: 30.7 PG (ref 28–32)
MCHC RBC AUTO-ENTMCNC: 32 G/DL (ref 31–35)
MCV RBC AUTO: 95.9 FL (ref 81–99)
MONOCYTES # BLD AUTO: 0.5 10*3/UL (ref 0.2–0.8)
MONOCYTES NFR BLD AUTO: 6.9 % (ref 4.4–11.3)
NEUTS SEG NFR BLD AUTO: 80.7 % (ref 38.7–80)
PLATELET # BLD AUTO: 382 X10E3/UL (ref 140–360)
POTASSIUM SERPL-SCNC: 3.8 MMOL/L (ref 3.5–5.1)
RBC # BLD AUTO: 3.39 X10E6/UL (ref 3.6–5.1)
SODIUM SERPL-SCNC: 136 MMOL/L (ref 136–145)

## 2020-04-17 RX ADMIN — METOCLOPRAMIDE SCH MG: 5 INJECTION, SOLUTION INTRAMUSCULAR; INTRAVENOUS at 11:36

## 2020-04-17 RX ADMIN — PROMETHAZINE HYDROCHLORIDE PRN MG: 25 INJECTION, SOLUTION INTRAMUSCULAR; INTRAVENOUS at 02:44

## 2020-04-17 RX ADMIN — SODIUM CHLORIDE PRN MG: 900 INJECTION INTRAVENOUS at 07:27

## 2020-04-17 RX ADMIN — METOCLOPRAMIDE SCH MG: 5 INJECTION, SOLUTION INTRAMUSCULAR; INTRAVENOUS at 05:50

## 2020-04-17 RX ADMIN — BUPROPION HYDROCHLORIDE SCH MG: 150 TABLET, FILM COATED ORAL at 08:19

## 2020-04-17 RX ADMIN — METOCLOPRAMIDE SCH MG: 5 INJECTION, SOLUTION INTRAMUSCULAR; INTRAVENOUS at 00:10

## 2020-04-17 RX ADMIN — VALSARTAN SCH MG: 80 TABLET ORAL at 08:18

## 2020-04-17 RX ADMIN — ENOXAPARIN SODIUM SCH MG: 30 INJECTION SUBCUTANEOUS at 08:19

## 2020-04-17 NOTE — NUR
PATIENT DISCHARGED FROM FACILITY. PATIENT GATHERED ALL PERSONAL BELONGINGS, DISCHARGE 
INSTRUCTIONS AND FOLLOW UP INFORMATION. PATIENT LEFT FACILITY IN STABLE CONDITION.

## 2020-04-17 NOTE — NUR
REPORT GIVEN TO DAYSHIFT NURSE. AAOX3. CDI DRESSING TO R  PICC LINE. NO SIGNS OF 
INFILTRATION. BED LOCKED AND IN LOW POSITION. CALL LIGHT WITHIN REACH

## 2020-04-17 NOTE — NUR
Pt unavailable at this time. Pt's  bedside. Will follow up as able. 



GERMANIA LOZANO



Spiritual Care Department

O: [phone]

## 2020-04-17 NOTE — NUR
PATIENT NAUSEATED. VOMITED 300 ML CLEAR PALE BROWN EMESIS. ZOFRAN GIVEN. NOTIFIED PATIENT TO 
PRESS CALL LIGHT FOR ASSISTANCE. WILL CONTINUE TO MONITOR PATIENT.

## 2020-04-17 NOTE — NUR
ORDERS FOR HOME HEALTH

CHOICE LETTER SIGNED BY PT FOR [facility] (IN NETWORK WITH [facility])

COPY OF CHOICE LETTER TO PT

IMM EXPLAINED TO PT, SIGNED BY PT AND PLACED IN CHART

COPY OF IMM TO PT

CALLED AND SPOKE WITH KRISTIAN [place] AT [facility]

FAXED ORDERS AND CLINICALS-CONFIRMATION REC'D

PH: [phone]   FAX:[phone]

## 2020-04-18 NOTE — DISCHARGE SUMMARY
FINAL DISCHARGE DIAGNOSES:  

1. Status post exploratory laparotomy with resection of a small bowel with complete

bowel obstruction secondary to open obstruction with small bowel resection with stable

side-to-side functional, end-to-end anastomosis. 

2. Abdominal pain with nausea and vomiting secondary to high-grade bowel obstruction

with status post bowel resection. 

3. Breast cancer with metastasis.

4. Recent diagnosis of atrial fibrillation.

5. Hypertension.

 

CONSULTANTS:  Hematology/Oncology, General Surgery.

 

PHYSICAL EXAMINATION:

VITAL SIGNS:  Temperature is 98, pulse 94, respiratory rate is 18, blood pressure

129/76, and pulse ox 95% on room air. 

LABORATORY DATA:  Show white count 7.5, hemoglobin 10.5, hematocrit is 33, and platelets

of 382.  Coagulation; PT 13.9, INR 1.01, and PTT 28.5.  Chemistry; sodium 136, potassium

3.8, chloride 101, bicarb 25, anion gap of 13.8, BUN is 18, creatinine is 0.7, glucose

is 98, calcium is 9.1, and magnesium is 1.6.  Total protein 7.2 and albumin is 2.8.

Lipase level was less than 4.  Urinalysis negative. 

 

IMAGING STUDIES:  Chest x-ray on 04/04/2020, shows pulmonary hyperinflation suggestive

of small airway disease.  No acute cardiopulmonary process.  CT abdomen and pelvis on

04/04/2020, shows a high-grade small bowel obstruction without any perforation.  No

abdominal mass is identified.  There is evidence of diffuse intrahepatic and osseous

metastasis.  Abdominal x-ray shows improved small bowel dilatation with diffuse osseous

metastasis.  She had serial abdominal x-ray.  She had CT abdomen and pelvis on

04/08/2020, which showed still a high-grade likely complete small bowel obstruction with

a transition point at the site of the small bowel anastomosis with mild mesenteric

edema.  There is some diffusely sclerotic bony metastasis in the axilla, appendicular

skeleton unchanged.  Multiple liver metastasis, unchanged.  The patient has CT abdomen

and pelvis on 04/14/2020.  This shows no specific findings of an ileus.  It did describe

some evidence of a cecal pneumatosis coli and there was some small amounts of air as

well.  __________ infection.  I discussed this case with the general surgeon, Dr. Stone Jacobs.  He states that this is a normal finding as described.  This is not

unusual according to the General Surgery. 

 

Discussed this case with him __________.  She is doing well, atrial fibrillation, normal

white count.  No further workup is needed. 

 

HOSPITAL COURSE:  This is a 64-year-old  female, known history of breast cancer

with metastasis, who comes into the ED with abdominal pain, nausea, vomiting, found to

have a high-grade small bowel obstruction.  General Surgery and Hematology/Oncology were

consulted.  Initially, the patient was treated conservatively with an NG tube to see if

her bowel obstruction resolves, but after several imaging studies here for several days,

the patient's bowel obstruction did not resolve.  The patient continued to be on NG tube

and is on IV TPN for nutrition.  The patient underwent surgery by General Surgery and

underwent a status post exploratory laparotomy and resection of small bowel with

complete bowel obstruction secondary to open obstruction with a small bowel resection

with stable side-to-side functional and end-to-end anastomosis that was performed by

General Surgery.  Postoperatively, the patient did extremely well.  She remained on NG

tube and still had evidence of output.  She did improve throughout the hospital course

and further imaging showed no evidence of bowel obstruction or any kind of ileus.  The

NG tube was eventually removed and she was started on a clear liquid diet and advanced

to solid food during her hospital course and she improved.  She was on IV antibiotics.

Hematology/Oncology was consulted and managed accordingly.  The patient was given

Neupogen while here in the hospital stay due to underlying leukopenia on admission.  I

did discuss the CT abdomen and pelvis findings with the general surgeon, Dr. Jacobs

__________ described some possible cecal pneumatosis coli as well as small amounts of

air, but apparently according to him, this is a normal finding post surgery and there is

no further workup needed.  He reports that he discussed this with Interventional

Radiology and this is a normal finding.  The patient is afebrile, had no complaints of

abdominal pain and was doing extremely well, and was tolerating diet well.  __________

she is doing well, normal white count, afebrile, and tolerating diet well.  According to

Dr. Jacbos, no further workup needed at this time __________ most likely to be a

normal finding according to Surgery.  At this time, the patient is cleared for discharge

by General Surgery as well as Hematology/Oncology.  I spoke with General Surgery on the

day of discharge on 04/17/2020 and __________ is cleared for discharge.  We iterated

back to the nursing staff as well.  On the day of discharge, vital signs were stable,

labs reviewed and stable.  The patient is seen and evaluated and examined thoroughly on

the day of discharge.  No other complaints.  The patient verbalized understanding and

agreed the plan of care to follow up as an outpatient with the PCP in one week and

General Surgery in 7 days.  The staples removed and she is to follow up with her

hematologist/oncologist in about 1 to 2 weeks' time.  The patient verbalized

understanding and agreed to plan of care.  She has been cleared for discharge by all

consultants. 

 

MEDICATIONS:  See medication reconciliation form.

 

DISPOSITION:  Home.

 

CONDITION:  Stable.

 

DIET:  Heart healthy. 

 

In the event of any worsening symptoms, the patient was advised to come back to the ED

for further evaluation. 

 

Discharge summary took greater than 35 minutes.

 

 

 

 

______________________________

MD SELWYN Valentine/JESSIE

D:  04/17/2020 22:35:48

T:  04/18/2020 01:01:55

Job #:  [id number]

## 2024-08-03 NOTE — NUR
A&Ox3. VSS. RA. . Denies chest pain and SOB.   Telemetry: NSR.   GI: Abdomen soft, nondistended.  Denies Passing gas. NGT to LIS at 55cm  Denies nausea.   : Mi in place/ draining roni urine  Denies pain during shift  Up with standby assist/walker  Drains: Mi in place/ NGT to LCS   Incisions: Midline with aquacel- scant drainage on dressing  Diet: NPO with ice chips  IVF running per order.   Continuous TPN running per MAR  All appropriate safety measures in place. All questions and concerns addressed.         Patient received lying in bed. AAO x 4. Patient had no complaints of pain. Respirations even 
and non-labored. NG tube connected to suction. IVF infusing at 100 cc/hr.  Safety measures 
in place. Patient instructed to call for assistance when needed. Call light within reach.